# Patient Record
Sex: MALE | Race: WHITE | Employment: FULL TIME | URBAN - METROPOLITAN AREA
[De-identification: names, ages, dates, MRNs, and addresses within clinical notes are randomized per-mention and may not be internally consistent; named-entity substitution may affect disease eponyms.]

---

## 2017-01-23 ENCOUNTER — OFFICE VISIT (OUTPATIENT)
Dept: PULMONOLOGY | Age: 32
End: 2017-01-23

## 2017-01-23 VITALS
TEMPERATURE: 98.4 F | BODY MASS INDEX: 42.66 KG/M2 | SYSTOLIC BLOOD PRESSURE: 142 MMHG | RESPIRATION RATE: 16 BRPM | DIASTOLIC BLOOD PRESSURE: 92 MMHG | HEIGHT: 72 IN | HEART RATE: 93 BPM | WEIGHT: 315 LBS | OXYGEN SATURATION: 98 %

## 2017-01-23 DIAGNOSIS — G47.33 OSA TREATED WITH BIPAP: Primary | ICD-10-CM

## 2017-01-23 DIAGNOSIS — R03.0 ELEVATED BLOOD PRESSURE (NOT HYPERTENSION): ICD-10-CM

## 2017-01-23 PROCEDURE — 99213 OFFICE O/P EST LOW 20 MIN: CPT | Performed by: NURSE PRACTITIONER

## 2017-01-23 ASSESSMENT — SLEEP AND FATIGUE QUESTIONNAIRES
HOW LIKELY ARE YOU TO NOD OFF OR FALL ASLEEP IN A CAR, WHILE STOPPED FOR A FEW MINUTES IN TRAFFIC: 0
HOW LIKELY ARE YOU TO NOD OFF OR FALL ASLEEP WHILE SITTING AND READING: 1
HOW LIKELY ARE YOU TO NOD OFF OR FALL ASLEEP WHILE SITTING AND TALKING TO SOMEONE: 0
HOW LIKELY ARE YOU TO NOD OFF OR FALL ASLEEP WHILE LYING DOWN TO REST IN THE AFTERNOON WHEN CIRCUMSTANCES PERMIT: 1
HOW LIKELY ARE YOU TO NOD OFF OR FALL ASLEEP WHILE SITTING QUIETLY AFTER LUNCH WITHOUT ALCOHOL: 0
NECK CIRCUMFERENCE (INCHES): 17.5
HOW LIKELY ARE YOU TO NOD OFF OR FALL ASLEEP WHEN YOU ARE A PASSENGER IN A CAR FOR AN HOUR WITHOUT A BREAK: 0
HOW LIKELY ARE YOU TO NOD OFF OR FALL ASLEEP WHILE WATCHING TV: 1
HOW LIKELY ARE YOU TO NOD OFF OR FALL ASLEEP WHILE SITTING INACTIVE IN A PUBLIC PLACE: 0
ESS TOTAL SCORE: 3

## 2017-02-02 ENCOUNTER — TELEPHONE (OUTPATIENT)
Dept: BARIATRICS/WEIGHT MGMT | Age: 32
End: 2017-02-02

## 2017-04-20 ENCOUNTER — OFFICE VISIT (OUTPATIENT)
Dept: BARIATRICS/WEIGHT MGMT | Age: 32
End: 2017-04-20

## 2017-04-20 VITALS
BODY MASS INDEX: 42.66 KG/M2 | HEART RATE: 79 BPM | RESPIRATION RATE: 20 BRPM | SYSTOLIC BLOOD PRESSURE: 137 MMHG | DIASTOLIC BLOOD PRESSURE: 89 MMHG | WEIGHT: 315 LBS | HEIGHT: 72 IN

## 2017-04-20 DIAGNOSIS — E66.01 MORBID OBESITY WITH BMI OF 45.0-49.9, ADULT (HCC): Primary | ICD-10-CM

## 2017-04-20 DIAGNOSIS — I10 HYPERTENSION, ESSENTIAL: ICD-10-CM

## 2017-04-20 DIAGNOSIS — G47.33 SLEEP APNEA, OBSTRUCTIVE: ICD-10-CM

## 2017-04-20 PROCEDURE — 99205 OFFICE O/P NEW HI 60 MIN: CPT | Performed by: SURGERY

## 2017-04-25 ENCOUNTER — TELEPHONE (OUTPATIENT)
Dept: BARIATRICS/WEIGHT MGMT | Age: 32
End: 2017-04-25

## 2017-05-31 ENCOUNTER — OFFICE VISIT (OUTPATIENT)
Dept: BARIATRICS/WEIGHT MGMT | Age: 32
End: 2017-05-31

## 2017-05-31 VITALS
HEIGHT: 72 IN | SYSTOLIC BLOOD PRESSURE: 138 MMHG | HEART RATE: 70 BPM | DIASTOLIC BLOOD PRESSURE: 88 MMHG | WEIGHT: 315 LBS | BODY MASS INDEX: 42.66 KG/M2 | RESPIRATION RATE: 16 BRPM

## 2017-05-31 DIAGNOSIS — G47.33 SLEEP APNEA, OBSTRUCTIVE: ICD-10-CM

## 2017-05-31 DIAGNOSIS — I10 HYPERTENSION, ESSENTIAL: ICD-10-CM

## 2017-05-31 DIAGNOSIS — E66.01 MORBID OBESITY WITH BMI OF 45.0-49.9, ADULT (HCC): Primary | ICD-10-CM

## 2017-05-31 PROCEDURE — 99213 OFFICE O/P EST LOW 20 MIN: CPT | Performed by: NURSE PRACTITIONER

## 2017-05-31 ASSESSMENT — ENCOUNTER SYMPTOMS
EYES NEGATIVE: 1
GASTROINTESTINAL NEGATIVE: 1
RESPIRATORY NEGATIVE: 1
ALLERGIC/IMMUNOLOGIC NEGATIVE: 1

## 2017-06-21 ENCOUNTER — OFFICE VISIT (OUTPATIENT)
Dept: BARIATRICS/WEIGHT MGMT | Age: 32
End: 2017-06-21

## 2017-06-21 VITALS
SYSTOLIC BLOOD PRESSURE: 135 MMHG | HEIGHT: 72 IN | DIASTOLIC BLOOD PRESSURE: 89 MMHG | RESPIRATION RATE: 18 BRPM | HEART RATE: 74 BPM | WEIGHT: 315 LBS | BODY MASS INDEX: 42.66 KG/M2

## 2017-06-21 DIAGNOSIS — I10 HYPERTENSION, ESSENTIAL: ICD-10-CM

## 2017-06-21 DIAGNOSIS — E66.01 MORBID OBESITY WITH BMI OF 45.0-49.9, ADULT (HCC): Primary | ICD-10-CM

## 2017-06-21 DIAGNOSIS — E66.01 MORBID OBESITY WITH BMI OF 45.0-49.9, ADULT (HCC): ICD-10-CM

## 2017-06-21 DIAGNOSIS — G47.33 SLEEP APNEA, OBSTRUCTIVE: ICD-10-CM

## 2017-06-21 LAB
A/G RATIO: 1.4 (ref 1.1–2.2)
ALBUMIN SERPL-MCNC: 4.5 G/DL (ref 3.4–5)
ALP BLD-CCNC: 70 U/L (ref 40–129)
ALT SERPL-CCNC: 29 U/L (ref 10–40)
ANION GAP SERPL CALCULATED.3IONS-SCNC: 16 MMOL/L (ref 3–16)
AST SERPL-CCNC: 19 U/L (ref 15–37)
BASOPHILS ABSOLUTE: 0 K/UL (ref 0–0.2)
BASOPHILS RELATIVE PERCENT: 0.3 %
BILIRUB SERPL-MCNC: 0.5 MG/DL (ref 0–1)
BUN BLDV-MCNC: 19 MG/DL (ref 7–20)
CALCIUM SERPL-MCNC: 9.9 MG/DL (ref 8.3–10.6)
CHLORIDE BLD-SCNC: 100 MMOL/L (ref 99–110)
CHOLESTEROL, TOTAL: 170 MG/DL (ref 0–199)
CO2: 25 MMOL/L (ref 21–32)
CREAT SERPL-MCNC: 0.7 MG/DL (ref 0.9–1.3)
EOSINOPHILS ABSOLUTE: 0.2 K/UL (ref 0–0.6)
EOSINOPHILS RELATIVE PERCENT: 2.8 %
FOLATE: 6.89 NG/ML (ref 4.78–24.2)
GFR AFRICAN AMERICAN: >60
GFR NON-AFRICAN AMERICAN: >60
GLOBULIN: 3.3 G/DL
GLUCOSE BLD-MCNC: 93 MG/DL (ref 70–99)
HCT VFR BLD CALC: 43.2 % (ref 40.5–52.5)
HDLC SERPL-MCNC: 53 MG/DL (ref 40–60)
HEMOGLOBIN: 13.7 G/DL (ref 13.5–17.5)
IRON SATURATION: 18 % (ref 20–50)
IRON: 73 UG/DL (ref 59–158)
LDL CHOLESTEROL CALCULATED: 95 MG/DL
LYMPHOCYTES ABSOLUTE: 2.4 K/UL (ref 1–5.1)
LYMPHOCYTES RELATIVE PERCENT: 32.1 %
MCH RBC QN AUTO: 26.5 PG (ref 26–34)
MCHC RBC AUTO-ENTMCNC: 31.7 G/DL (ref 31–36)
MCV RBC AUTO: 83.5 FL (ref 80–100)
MONOCYTES ABSOLUTE: 0.6 K/UL (ref 0–1.3)
MONOCYTES RELATIVE PERCENT: 7.6 %
NEUTROPHILS ABSOLUTE: 4.2 K/UL (ref 1.7–7.7)
NEUTROPHILS RELATIVE PERCENT: 57.2 %
PDW BLD-RTO: 15.1 % (ref 12.4–15.4)
PLATELET # BLD: 328 K/UL (ref 135–450)
PMV BLD AUTO: 8.8 FL (ref 5–10.5)
POTASSIUM SERPL-SCNC: 4.6 MMOL/L (ref 3.5–5.1)
RBC # BLD: 5.18 M/UL (ref 4.2–5.9)
SODIUM BLD-SCNC: 141 MMOL/L (ref 136–145)
TOTAL IRON BINDING CAPACITY: 395 UG/DL (ref 260–445)
TOTAL PROTEIN: 7.8 G/DL (ref 6.4–8.2)
TRIGL SERPL-MCNC: 112 MG/DL (ref 0–150)
TSH REFLEX: 3.94 UIU/ML (ref 0.27–4.2)
VITAMIN B-12: 543 PG/ML (ref 211–911)
VITAMIN D 25-HYDROXY: 30.2 NG/ML
VLDLC SERPL CALC-MCNC: 22 MG/DL
WBC # BLD: 7.4 K/UL (ref 4–11)

## 2017-06-21 PROCEDURE — 99213 OFFICE O/P EST LOW 20 MIN: CPT | Performed by: NURSE PRACTITIONER

## 2017-06-21 ASSESSMENT — ENCOUNTER SYMPTOMS
EYES NEGATIVE: 1
RESPIRATORY NEGATIVE: 1
GASTROINTESTINAL NEGATIVE: 1
ALLERGIC/IMMUNOLOGIC NEGATIVE: 1

## 2017-06-22 ENCOUNTER — OFFICE VISIT (OUTPATIENT)
Dept: CARDIOLOGY CLINIC | Age: 32
End: 2017-06-22

## 2017-06-22 VITALS
HEART RATE: 68 BPM | WEIGHT: 315 LBS | DIASTOLIC BLOOD PRESSURE: 86 MMHG | BODY MASS INDEX: 42.66 KG/M2 | OXYGEN SATURATION: 94 % | SYSTOLIC BLOOD PRESSURE: 122 MMHG | HEIGHT: 72 IN

## 2017-06-22 DIAGNOSIS — I10 HYPERTENSION, ESSENTIAL: ICD-10-CM

## 2017-06-22 DIAGNOSIS — E66.01 MORBID OBESITY WITH BMI OF 45.0-49.9, ADULT (HCC): ICD-10-CM

## 2017-06-22 DIAGNOSIS — Z01.818 PRE-OP TESTING: Primary | ICD-10-CM

## 2017-06-22 LAB
ESTIMATED AVERAGE GLUCOSE: 125.5 MG/DL
HBA1C MFR BLD: 6 %

## 2017-06-22 PROCEDURE — 99204 OFFICE O/P NEW MOD 45 MIN: CPT | Performed by: INTERNAL MEDICINE

## 2017-06-22 PROCEDURE — 93000 ELECTROCARDIOGRAM COMPLETE: CPT | Performed by: INTERNAL MEDICINE

## 2017-07-07 ENCOUNTER — TELEPHONE (OUTPATIENT)
Dept: BARIATRICS/WEIGHT MGMT | Age: 32
End: 2017-07-07

## 2017-07-19 ENCOUNTER — OFFICE VISIT (OUTPATIENT)
Dept: BARIATRICS/WEIGHT MGMT | Age: 32
End: 2017-07-19

## 2017-07-19 VITALS
HEART RATE: 73 BPM | WEIGHT: 315 LBS | DIASTOLIC BLOOD PRESSURE: 88 MMHG | HEIGHT: 72 IN | SYSTOLIC BLOOD PRESSURE: 133 MMHG | BODY MASS INDEX: 42.66 KG/M2

## 2017-07-19 DIAGNOSIS — G47.33 SLEEP APNEA, OBSTRUCTIVE: ICD-10-CM

## 2017-07-19 DIAGNOSIS — I10 HYPERTENSION, ESSENTIAL: ICD-10-CM

## 2017-07-19 DIAGNOSIS — E66.01 MORBID OBESITY WITH BMI OF 45.0-49.9, ADULT (HCC): Primary | ICD-10-CM

## 2017-07-19 PROCEDURE — 99213 OFFICE O/P EST LOW 20 MIN: CPT | Performed by: NURSE PRACTITIONER

## 2017-07-19 ASSESSMENT — ENCOUNTER SYMPTOMS
EYES NEGATIVE: 1
RESPIRATORY NEGATIVE: 1
ALLERGIC/IMMUNOLOGIC NEGATIVE: 1
GASTROINTESTINAL NEGATIVE: 1

## 2017-07-28 ENCOUNTER — HOSPITAL ENCOUNTER (OUTPATIENT)
Dept: ENDOSCOPY | Age: 32
Discharge: HOME | End: 2017-07-28
Attending: SURGERY | Admitting: SURGERY

## 2017-07-28 VITALS
HEART RATE: 58 BPM | WEIGHT: 315 LBS | RESPIRATION RATE: 16 BRPM | BODY MASS INDEX: 48.96 KG/M2 | TEMPERATURE: 97.1 F | SYSTOLIC BLOOD PRESSURE: 108 MMHG | OXYGEN SATURATION: 98 % | DIASTOLIC BLOOD PRESSURE: 66 MMHG

## 2017-07-28 DIAGNOSIS — K21.9 CHRONIC GERD: ICD-10-CM

## 2017-07-28 PROCEDURE — 43239 EGD BIOPSY SINGLE/MULTIPLE: CPT | Performed by: SURGERY

## 2017-07-28 RX ORDER — FAMOTIDINE 20 MG/1
20 TABLET, FILM COATED ORAL NIGHTLY PRN
Qty: 60 TABLET | Refills: 1 | Status: SHIPPED | OUTPATIENT
Start: 2017-07-28 | End: 2017-09-05 | Stop reason: ALTCHOICE

## 2017-07-28 RX ORDER — SODIUM CHLORIDE 0.9 % (FLUSH) 0.9 %
10 SYRINGE (ML) INJECTION EVERY 12 HOURS SCHEDULED
Status: DISCONTINUED | OUTPATIENT
Start: 2017-07-28 | End: 2017-07-29 | Stop reason: HOSPADM

## 2017-07-28 RX ORDER — SODIUM CHLORIDE 0.9 % (FLUSH) 0.9 %
10 SYRINGE (ML) INJECTION PRN
Status: DISCONTINUED | OUTPATIENT
Start: 2017-07-28 | End: 2017-07-29 | Stop reason: HOSPADM

## 2017-07-28 RX ORDER — SODIUM CHLORIDE 9 MG/ML
INJECTION, SOLUTION INTRAVENOUS CONTINUOUS
Status: DISCONTINUED | OUTPATIENT
Start: 2017-07-28 | End: 2017-07-29 | Stop reason: HOSPADM

## 2017-07-28 RX ADMIN — SODIUM CHLORIDE: 9 INJECTION, SOLUTION INTRAVENOUS at 11:13

## 2017-07-28 ASSESSMENT — ENCOUNTER SYMPTOMS: SHORTNESS OF BREATH: 0

## 2017-08-03 ENCOUNTER — INITIAL CONSULT (OUTPATIENT)
Dept: BARIATRICS/WEIGHT MGMT | Age: 32
End: 2017-08-03

## 2017-08-03 ENCOUNTER — HOSPITAL ENCOUNTER (OUTPATIENT)
Dept: ULTRASOUND IMAGING | Age: 32
Discharge: HOME | End: 2017-08-03
Attending: SURGERY | Admitting: SURGERY

## 2017-08-03 DIAGNOSIS — E66.01 MORBID OBESITY WITH BMI OF 45.0-49.9, ADULT (HCC): ICD-10-CM

## 2017-08-03 DIAGNOSIS — G47.33 SLEEP APNEA, OBSTRUCTIVE: ICD-10-CM

## 2017-08-03 DIAGNOSIS — E66.01 MORBID OBESITY WITH BMI OF 45.0-49.9, ADULT (HCC): Primary | ICD-10-CM

## 2017-08-03 DIAGNOSIS — I10 HYPERTENSION, ESSENTIAL: ICD-10-CM

## 2017-08-03 DIAGNOSIS — E66.01 MORBID (SEVERE) OBESITY DUE TO EXCESS CALORIES (HCC): ICD-10-CM

## 2017-08-03 PROCEDURE — 96101 PR PSYCHOLOGIC TESTING BY PSYCH/PHYS: CPT | Performed by: PSYCHOLOGIST

## 2017-08-03 PROCEDURE — 90791 PSYCH DIAGNOSTIC EVALUATION: CPT | Performed by: PSYCHOLOGIST

## 2017-08-11 ENCOUNTER — TELEPHONE (OUTPATIENT)
Dept: BARIATRICS/WEIGHT MGMT | Age: 32
End: 2017-08-11

## 2017-08-11 DIAGNOSIS — K80.20 GALLSTONES: ICD-10-CM

## 2017-08-11 DIAGNOSIS — K76.0 FATTY LIVER: Primary | ICD-10-CM

## 2017-08-23 ENCOUNTER — OFFICE VISIT (OUTPATIENT)
Dept: BARIATRICS/WEIGHT MGMT | Age: 32
End: 2017-08-23

## 2017-08-23 VITALS
HEART RATE: 76 BPM | DIASTOLIC BLOOD PRESSURE: 88 MMHG | RESPIRATION RATE: 16 BRPM | WEIGHT: 315 LBS | HEIGHT: 72 IN | SYSTOLIC BLOOD PRESSURE: 138 MMHG | BODY MASS INDEX: 42.66 KG/M2

## 2017-08-23 DIAGNOSIS — K21.9 CHRONIC GERD: ICD-10-CM

## 2017-08-23 DIAGNOSIS — G47.33 SLEEP APNEA, OBSTRUCTIVE: ICD-10-CM

## 2017-08-23 DIAGNOSIS — K76.0 FATTY LIVER: ICD-10-CM

## 2017-08-23 DIAGNOSIS — E66.01 MORBID OBESITY WITH BMI OF 45.0-49.9, ADULT (HCC): Primary | ICD-10-CM

## 2017-08-23 DIAGNOSIS — I10 HYPERTENSION, ESSENTIAL: ICD-10-CM

## 2017-08-23 PROCEDURE — 99213 OFFICE O/P EST LOW 20 MIN: CPT | Performed by: NURSE PRACTITIONER

## 2017-08-23 ASSESSMENT — ENCOUNTER SYMPTOMS
ALLERGIC/IMMUNOLOGIC NEGATIVE: 1
RESPIRATORY NEGATIVE: 1
EYES NEGATIVE: 1
GASTROINTESTINAL NEGATIVE: 1

## 2017-09-05 ENCOUNTER — OFFICE VISIT (OUTPATIENT)
Dept: FAMILY MEDICINE CLINIC | Age: 32
End: 2017-09-05

## 2017-09-05 VITALS
HEART RATE: 76 BPM | WEIGHT: 315 LBS | RESPIRATION RATE: 16 BRPM | DIASTOLIC BLOOD PRESSURE: 95 MMHG | SYSTOLIC BLOOD PRESSURE: 139 MMHG | TEMPERATURE: 98.2 F | HEIGHT: 70 IN | BODY MASS INDEX: 45.1 KG/M2

## 2017-09-05 DIAGNOSIS — I10 HYPERTENSION, ESSENTIAL: ICD-10-CM

## 2017-09-05 DIAGNOSIS — K21.9 CHRONIC GERD: ICD-10-CM

## 2017-09-05 DIAGNOSIS — K80.20 GALLSTONES: ICD-10-CM

## 2017-09-05 DIAGNOSIS — K76.0 FATTY LIVER: ICD-10-CM

## 2017-09-05 DIAGNOSIS — G47.33 SLEEP APNEA, OBSTRUCTIVE: ICD-10-CM

## 2017-09-05 DIAGNOSIS — Z23 NEED FOR PROPHYLACTIC VACCINATION AGAINST DIPHTHERIA-TETANUS-PERTUSSIS (DTP): Primary | ICD-10-CM

## 2017-09-05 DIAGNOSIS — E66.01 MORBID OBESITY WITH BMI OF 45.0-49.9, ADULT (HCC): ICD-10-CM

## 2017-09-05 PROCEDURE — 99203 OFFICE O/P NEW LOW 30 MIN: CPT | Performed by: FAMILY MEDICINE

## 2017-09-05 PROCEDURE — 90715 TDAP VACCINE 7 YRS/> IM: CPT | Performed by: FAMILY MEDICINE

## 2017-09-05 PROCEDURE — 90471 IMMUNIZATION ADMIN: CPT | Performed by: FAMILY MEDICINE

## 2017-09-05 ASSESSMENT — PATIENT HEALTH QUESTIONNAIRE - PHQ9
1. LITTLE INTEREST OR PLEASURE IN DOING THINGS: 0
SUM OF ALL RESPONSES TO PHQ QUESTIONS 1-9: 0
SUM OF ALL RESPONSES TO PHQ9 QUESTIONS 1 & 2: 0
2. FEELING DOWN, DEPRESSED OR HOPELESS: 0

## 2017-09-05 ASSESSMENT — ENCOUNTER SYMPTOMS
ABDOMINAL PAIN: 0
CONSTIPATION: 0
NAUSEA: 0
VOMITING: 0
DIARRHEA: 0

## 2017-09-14 ENCOUNTER — OFFICE VISIT (OUTPATIENT)
Dept: BARIATRICS/WEIGHT MGMT | Age: 32
End: 2017-09-14

## 2017-09-14 VITALS
HEART RATE: 70 BPM | BODY MASS INDEX: 42.66 KG/M2 | SYSTOLIC BLOOD PRESSURE: 138 MMHG | HEIGHT: 72 IN | DIASTOLIC BLOOD PRESSURE: 99 MMHG | RESPIRATION RATE: 16 BRPM | WEIGHT: 315 LBS

## 2017-09-14 DIAGNOSIS — G47.33 SLEEP APNEA, OBSTRUCTIVE: ICD-10-CM

## 2017-09-14 DIAGNOSIS — K21.9 CHRONIC GERD: ICD-10-CM

## 2017-09-14 DIAGNOSIS — K80.20 GALLSTONES: ICD-10-CM

## 2017-09-14 DIAGNOSIS — K76.0 FATTY LIVER: ICD-10-CM

## 2017-09-14 DIAGNOSIS — I10 HYPERTENSION, ESSENTIAL: ICD-10-CM

## 2017-09-14 DIAGNOSIS — E66.01 MORBID OBESITY WITH BMI OF 45.0-49.9, ADULT (HCC): Primary | ICD-10-CM

## 2017-09-14 PROCEDURE — 99213 OFFICE O/P EST LOW 20 MIN: CPT | Performed by: SURGERY

## 2017-09-21 ENCOUNTER — OFFICE VISIT (OUTPATIENT)
Dept: FAMILY MEDICINE CLINIC | Age: 32
End: 2017-09-21

## 2017-09-21 VITALS
DIASTOLIC BLOOD PRESSURE: 84 MMHG | SYSTOLIC BLOOD PRESSURE: 142 MMHG | RESPIRATION RATE: 14 BRPM | WEIGHT: 315 LBS | BODY MASS INDEX: 42.66 KG/M2 | OXYGEN SATURATION: 98 % | HEIGHT: 72 IN | HEART RATE: 89 BPM

## 2017-09-21 DIAGNOSIS — I10 HYPERTENSION, ESSENTIAL: ICD-10-CM

## 2017-09-21 DIAGNOSIS — E66.01 MORBID OBESITY WITH BMI OF 45.0-49.9, ADULT (HCC): Primary | ICD-10-CM

## 2017-09-21 PROCEDURE — 99213 OFFICE O/P EST LOW 20 MIN: CPT | Performed by: FAMILY MEDICINE

## 2017-10-02 NOTE — PROGRESS NOTES
Patient Name:  Lavon Oviedo            Type of Surgery: Sleeve          Date of Surgery: 10/30/17             Start Pre-Op Diet On:  10/17/17           Start Clear Liquids On: 10/29/17         If you are having a gastric bypass, start Bowel Prep between 2-4pm the day prior to surgery. NPO after midnight the night before your surgery! Take morning medications with a small amount of water.     NOTES:___________________________________________________________________________________________________________________________________________________________________________________________________________________________________________________________________________________________________________________________________________________________________________________

## 2017-10-03 ENCOUNTER — OFFICE VISIT (OUTPATIENT)
Dept: BARIATRICS/WEIGHT MGMT | Age: 32
End: 2017-10-03

## 2017-10-03 VITALS
SYSTOLIC BLOOD PRESSURE: 130 MMHG | HEIGHT: 72 IN | BODY MASS INDEX: 42.66 KG/M2 | WEIGHT: 315 LBS | RESPIRATION RATE: 16 BRPM | HEART RATE: 86 BPM | DIASTOLIC BLOOD PRESSURE: 70 MMHG

## 2017-10-03 DIAGNOSIS — E66.01 MORBID OBESITY WITH BMI OF 45.0-49.9, ADULT (HCC): Primary | ICD-10-CM

## 2017-10-03 PROCEDURE — 99999 PR OFFICE/OUTPT VISIT,PROCEDURE ONLY: CPT | Performed by: NURSE PRACTITIONER

## 2017-10-03 NOTE — PROGRESS NOTES
Patient attended preoperative class where they were provided with education related to their bariatric surgery, common surgical complications, medications preoperatively & postoperatively, special concerns related to bariatric surgery postoperatively, vitamin supplementation, patient agreement, PAT & scheduling, hospital course, wellness discovery program and what to do in the case of an emergency postoperatively. The dietitians reviewed all preoperative and postoperative diet instructions.

## 2017-10-05 ENCOUNTER — TELEPHONE (OUTPATIENT)
Dept: BARIATRICS/WEIGHT MGMT | Age: 32
End: 2017-10-05

## 2017-10-09 ENCOUNTER — TELEPHONE (OUTPATIENT)
Dept: BARIATRICS/WEIGHT MGMT | Age: 32
End: 2017-10-09

## 2017-10-09 NOTE — TELEPHONE ENCOUNTER
Klarissa please call patient regarding surgery dates options. He stated he had spoken with you before.

## 2017-11-01 ENCOUNTER — OFFICE VISIT (OUTPATIENT)
Dept: FAMILY MEDICINE CLINIC | Age: 32
End: 2017-11-01

## 2017-11-01 VITALS
DIASTOLIC BLOOD PRESSURE: 101 MMHG | SYSTOLIC BLOOD PRESSURE: 129 MMHG | HEIGHT: 69 IN | BODY MASS INDEX: 46.65 KG/M2 | TEMPERATURE: 98.4 F | WEIGHT: 315 LBS | HEART RATE: 80 BPM | RESPIRATION RATE: 16 BRPM

## 2017-11-01 DIAGNOSIS — Z01.818 PREOP GENERAL PHYSICAL EXAM: Primary | ICD-10-CM

## 2017-11-01 PROCEDURE — G8484 FLU IMMUNIZE NO ADMIN: HCPCS | Performed by: FAMILY MEDICINE

## 2017-11-01 PROCEDURE — G8417 CALC BMI ABV UP PARAM F/U: HCPCS | Performed by: FAMILY MEDICINE

## 2017-11-01 PROCEDURE — G8427 DOCREV CUR MEDS BY ELIG CLIN: HCPCS | Performed by: FAMILY MEDICINE

## 2017-11-01 PROCEDURE — 99244 OFF/OP CNSLTJ NEW/EST MOD 40: CPT | Performed by: FAMILY MEDICINE

## 2017-11-01 NOTE — PROGRESS NOTES
Preoperative Consultation      Ej Vila  YOB: 1985    Date of Service:  11/1/2017    Vitals:    11/01/17 0950 11/01/17 0955   BP: (!) 135/94 (!) 129/101   Site: Left Arm Right Arm   Position: Sitting Sitting   Pulse: 74 80   Resp: 16    Temp: 98.4 °F (36.9 °C)    TempSrc: Oral    Weight: (!) 354 lb (160.6 kg)    Height: 5' 9\" (1.753 m)       Wt Readings from Last 2 Encounters:   11/01/17 (!) 354 lb (160.6 kg)   10/06/17 (!) 360 lb (163.3 kg)     BP Readings from Last 3 Encounters:   11/01/17 (!) 129/101   10/03/17 130/70   09/21/17 (!) 142/84        Chief Complaint   Patient presents with   Conrado Arora Pre-op Exam     weight loss, Dr. Radha Isaac, 11/13/17, Raymundo Dean     No Known Allergies  No outpatient prescriptions have been marked as taking for the 11/1/17 encounter (Office Visit) with Fanta Madrid DO. This patient presents to the office today for a preoperative consultation at the request of surgeon, Dr. Radha Isaac, who plans on performing weight loss surgery on November 13 2017 at [facility].  The current problem began - pt has always been heavy and symptoms (sleep apnea) has been improving. Conservative therapy: yes, diets, which have not been effective. Started recommended pre-op diet 2 days ago.     Planned anesthesia: General   Known anesthesia problems: None   Bleeding risk: No recent or remote history of abnormal bleeding  Personal or FH of DVT/PE: No    Patient objection to receiving blood products: No    Patient Active Problem List   Diagnosis    Sleep apnea, obstructive    Morbid obesity with BMI of 45.0-49.9, adult ([address]    Hypertension, essential    Chronic GERD    Gallstones    Fatty liver       Past Medical History:   Diagnosis Date    Hypertension, essential 4/20/2017    Sleep apnea     USES BI-PAP     Past Surgical History:   Procedure Laterality Date    UPPER GASTROINTESTINAL ENDOSCOPY  07/28/2017     Family History   Problem Relation Age of and time. He has normal strength. No cranial nerve deficit or sensory deficit. Coordination and gait normal.   Skin: Skin is warm and dry. No rash noted. No erythema. Psychiatric: He has a normal mood and affect.  His behavior is normal.     EKG Interpretation: Had EKG in June 2017 and was told he did not another one  Lab Review   Hospital Outpatient Visit on 10/30/2017   Component Date Value    Sodium 10/31/2017 146*    Potassium 10/31/2017 4.2     Chloride 10/31/2017 104     CO2 10/31/2017 26     Anion Gap 10/31/2017 16     Glucose 10/31/2017 91     BUN 10/31/2017 18     CREATININE 10/31/2017 0.8*    GFR Non- 10/31/2017 >60     GFR  10/31/2017 >60     Calcium 10/31/2017 9.5     Total Protein 10/31/2017 7.7     Alb 10/31/2017 4.2     Albumin/Globulin Ratio 10/31/2017 1.2     Total Bilirubin 10/31/2017 0.3     Alkaline Phosphatase 10/31/2017 72     ALT 10/31/2017 39     AST 10/31/2017 22     Globulin 10/31/2017 3.5     WBC 10/31/2017 8.2     RBC 10/31/2017 5.32     Hemoglobin 10/31/2017 14.4     Hematocrit 10/31/2017 43.9     MCV 10/31/2017 82.6     MCH 10/31/2017 27.2     MCHC 10/31/2017 32.9     RDW 10/31/2017 14.7     Platelets 80/81/3043 325     MPV 10/31/2017 9.0     ABO/Rh 10/31/2017 A NEG     Antibody Screen 10/31/2017 NEG    Orders Only on 06/21/2017   Component Date Value    WBC 06/21/2017 7.4     RBC 06/21/2017 5.18     Hemoglobin 06/21/2017 13.7     Hematocrit 06/21/2017 43.2     MCV 06/21/2017 83.5     MCH 06/21/2017 26.5     MCHC 06/21/2017 31.7     RDW 06/21/2017 15.1     Platelets 44/19/6785 328     MPV 06/21/2017 8.8     Neutrophils % 06/21/2017 57.2     Lymphocytes % 06/21/2017 32.1     Monocytes % 06/21/2017 7.6     Eosinophils % 06/21/2017 2.8     Basophils % 06/21/2017 0.3     Neutrophils # 06/21/2017 4.2     Lymphocytes # 06/21/2017 2.4     Monocytes # 06/21/2017 0.6     Eosinophils # 06/21/2017 0.2     Basophils administration of higher-dose, long-acting metoprolol in beta-blockernaïve patients on the day of surgery, and in the absence of dose titration is associated with an overall increase in mortality. Beta-blockers should be started days to weeks prior to surgery and titrated to pulse < 70.  4. Deep vein thrombosis prophylaxis: regimen to be chosen by surgical team  5.  No contraindications to planned surgery

## 2017-11-14 PROBLEM — Z98.84 S/P LAPAROSCOPIC SLEEVE GASTRECTOMY: Status: ACTIVE | Noted: 2017-11-14

## 2017-11-20 ENCOUNTER — TELEPHONE (OUTPATIENT)
Dept: BARIATRICS/WEIGHT MGMT | Age: 32
End: 2017-11-20

## 2017-11-20 NOTE — TELEPHONE ENCOUNTER
Surgery Type: Sleeve    Surgery Date: 11/13/17    The patient was contacted to follow up on their recent bariatric surgery. The following topics were reviewed:    [x] Hydration is Adequate   o Patient is getting at least 48-64 oz of fluids a day, not including protein shakes. [x] Food intake is appropriate  [x]Adequately pureeing foods, so that there are no chunks left. [x]Taking in 1-2 oz at a time  [x] Eating 4-6 times a day  [x]Reminded patient to keep food diary to bring to their 2 week follow up appointment. [x] Consuming Adequate Protein-Nectar    [x] Consuming 2 protein shakes a day        [x] Consuming 60-80 grams of protein a day  [x] Pain relief techniques utilized- Minimal pain   [] Taking pain medication as prescribed  [] Utilizing Lidoderm patches  []Taking Tylenol instead of prescription pain medication  [] Wearing abdominal binder  [x] Using ice for incisional pain  [x] Activity is appropriate  [x] Walking 10 minutes out of every hour  [x]Avoiding heavy lifting (>10lbs)  [x] Utilizing their incentive spirometer- Recommended use  [] Issues with Nausea/Vomiting/Reflux  [] Using Zofran PRN for nausea   []Taking Prilosec for reflux  [x] Following the 30-30-30 rule  [] Issues with Constipation-Denies  []Tried Colace  []Tried Miralax    All questions and concerns addressed. Patient seems to be doing very well. He plans to go back to work on 11/27/17 which is 2 weeks from his surgery. He will call on Wednesday to have a letter written as long as he is still feeling well. Patient was asked to please fill out hospital survey if she receives one in the mail.

## 2017-11-30 ENCOUNTER — OFFICE VISIT (OUTPATIENT)
Dept: BARIATRICS/WEIGHT MGMT | Age: 32
End: 2017-11-30

## 2017-11-30 VITALS
BODY MASS INDEX: 42.66 KG/M2 | RESPIRATION RATE: 16 BRPM | SYSTOLIC BLOOD PRESSURE: 138 MMHG | HEIGHT: 72 IN | DIASTOLIC BLOOD PRESSURE: 92 MMHG | HEART RATE: 67 BPM | WEIGHT: 315 LBS

## 2017-11-30 DIAGNOSIS — Z98.84 S/P LAPAROSCOPIC SLEEVE GASTRECTOMY: ICD-10-CM

## 2017-11-30 DIAGNOSIS — K21.9 CHRONIC GERD: ICD-10-CM

## 2017-11-30 DIAGNOSIS — G47.33 SLEEP APNEA, OBSTRUCTIVE: ICD-10-CM

## 2017-11-30 DIAGNOSIS — E66.01 MORBID OBESITY WITH BMI OF 45.0-49.9, ADULT (HCC): Primary | ICD-10-CM

## 2017-11-30 DIAGNOSIS — K76.0 FATTY LIVER: ICD-10-CM

## 2017-11-30 DIAGNOSIS — I10 HYPERTENSION, ESSENTIAL: ICD-10-CM

## 2017-11-30 PROCEDURE — 99024 POSTOP FOLLOW-UP VISIT: CPT | Performed by: SURGERY

## 2017-11-30 NOTE — PROGRESS NOTES
Dietary Assessment Note    Vitals:   Vitals:    11/30/17 1446 11/30/17 1448   BP: (!) 139/93 (!) 138/92   Pulse: 67    Resp: 16    Weight: (!) 329 lb 12.8 oz (149.6 kg)    Height: 6' (1.829 m)     Patient lost 36.2 lbs over 1 month. Total Weight Loss: 37 lbs    Labs reviewed: no lab studies available for review at time of visit    Protein intake: 60-80 grams/day     Fluid intake: 48-64 oz/day    Multivitamin/mineral intake: Fusion - how many/day: 3    Calcium intake: 1 Citracal petite    Other: none    Exercise: Yes. How much: trying to get in his walking    Nutrition Assessment: 2 wks post-op visit. eats a balanced diet Pt able to eat 3 oz in one sitting. B-shake, S-oatmeal or yogurt, L-tomato soup OR mashed pot OR lf cottage cheese, D-refried beans OR pureed tuna/chicken, S-shake and sometimes sf jello/pudding. Pt is drinking propel, decaf coffee. Following 30-30-30 rule.       Food Intolerances/issues: none    Client Concerns: None    Goals: Advance to Phase 3 on Monday 12/4    Plan: F/u at 6 post op or as directed      [facility]

## 2018-01-03 ENCOUNTER — OFFICE VISIT (OUTPATIENT)
Dept: BARIATRICS/WEIGHT MGMT | Age: 33
End: 2018-01-03

## 2018-01-03 VITALS
HEART RATE: 66 BPM | DIASTOLIC BLOOD PRESSURE: 98 MMHG | SYSTOLIC BLOOD PRESSURE: 140 MMHG | HEIGHT: 72 IN | BODY MASS INDEX: 42.66 KG/M2 | WEIGHT: 315 LBS | RESPIRATION RATE: 16 BRPM

## 2018-01-03 DIAGNOSIS — G47.33 SLEEP APNEA, OBSTRUCTIVE: ICD-10-CM

## 2018-01-03 DIAGNOSIS — Z98.84 S/P LAPAROSCOPIC SLEEVE GASTRECTOMY: Primary | ICD-10-CM

## 2018-01-03 DIAGNOSIS — I10 HYPERTENSION, ESSENTIAL: ICD-10-CM

## 2018-01-03 DIAGNOSIS — E66.01 MORBID OBESITY WITH BMI OF 40.0-44.9, ADULT (HCC): ICD-10-CM

## 2018-01-03 DIAGNOSIS — K21.9 CHRONIC GERD: ICD-10-CM

## 2018-01-03 DIAGNOSIS — K76.0 FATTY LIVER: ICD-10-CM

## 2018-01-03 PROCEDURE — 99024 POSTOP FOLLOW-UP VISIT: CPT | Performed by: NURSE PRACTITIONER

## 2018-01-03 RX ORDER — CYANOCOBALAMIN (VITAMIN B-12) 1000 MCG
1 TABLET, EXTENDED RELEASE ORAL
COMMUNITY
End: 2018-08-15

## 2018-01-03 ASSESSMENT — ENCOUNTER SYMPTOMS
GASTROINTESTINAL NEGATIVE: 1
RESPIRATORY NEGATIVE: 1
ROS SKIN COMMENTS: SURGICAL INCISIONS.
EYES NEGATIVE: 1
ALLERGIC/IMMUNOLOGIC NEGATIVE: 1

## 2018-01-03 NOTE — PROGRESS NOTES
1    ondansetron (ZOFRAN ODT) 4 MG disintegrating tablet, Take 2 tablets by mouth every 8 hours as needed for Nausea, Disp: 30 tablet, Rfl: 1    Review of Systems   Constitutional: Negative. HENT: Negative. Eyes: Negative. Respiratory: Negative. Cardiovascular: Negative. Gastrointestinal: Negative. Endocrine: Negative. Genitourinary: Negative. Musculoskeletal: Negative. Skin:        Surgical incisions. Allergic/Immunologic: Negative. Neurological: Negative. Hematological: Negative. Psychiatric/Behavioral: Negative. Objective:   Physical Exam   Constitutional: He is oriented to person, place, and time. He appears well-developed and well-nourished. HENT:   Head: Normocephalic and atraumatic. Eyes: Conjunctivae are normal. Pupils are equal, round, and reactive to light. Neck: Normal range of motion. Neck supple. Cardiovascular: Normal rate. Pulmonary/Chest: Effort normal.   Abdominal: Soft. Musculoskeletal: Normal range of motion. Neurological: He is alert and oriented to person, place, and time. Skin: Skin is warm and dry. Surgical incisions well healed. Psychiatric: He has a normal mood and affect. His behavior is normal. Judgment and thought content normal.   Vitals reviewed. Assessment and Plan:     Patient is 6 weeks s/p sleeve, down 4.8 lbs with a total weight loss of 41.8 lbs. The patient's current Body mass index is 44.08 kg/m². (1/3/18). He is doing well, denies n/v/dysphagia or reflux. He is tolerating diet, getting adequate fluids and protein. Needs to have more structured meal times. Bowels and bladder functioning. He is taking vitamins as instructed. He did meet with the registered dietitian for continued follow up. I agree with recommendations and plan. He is limited with exercising due to weather. Encouraged him to increase physical activity and the importance of exercise and weight loss. Incisions healing well.  No lifting restrictions

## 2018-01-26 ENCOUNTER — OFFICE VISIT (OUTPATIENT)
Dept: PULMONOLOGY | Age: 33
End: 2018-01-26

## 2018-01-26 VITALS
DIASTOLIC BLOOD PRESSURE: 88 MMHG | WEIGHT: 315 LBS | OXYGEN SATURATION: 97 % | BODY MASS INDEX: 42.66 KG/M2 | HEIGHT: 72 IN | RESPIRATION RATE: 18 BRPM | TEMPERATURE: 98.1 F | SYSTOLIC BLOOD PRESSURE: 136 MMHG | HEART RATE: 74 BPM

## 2018-01-26 DIAGNOSIS — G47.33 SLEEP APNEA, OBSTRUCTIVE: Primary | ICD-10-CM

## 2018-01-26 DIAGNOSIS — E66.01 MORBID OBESITY WITH BMI OF 40.0-44.9, ADULT (HCC): ICD-10-CM

## 2018-01-26 PROCEDURE — G8427 DOCREV CUR MEDS BY ELIG CLIN: HCPCS | Performed by: NURSE PRACTITIONER

## 2018-01-26 PROCEDURE — G8484 FLU IMMUNIZE NO ADMIN: HCPCS | Performed by: NURSE PRACTITIONER

## 2018-01-26 PROCEDURE — G8417 CALC BMI ABV UP PARAM F/U: HCPCS | Performed by: NURSE PRACTITIONER

## 2018-01-26 PROCEDURE — 99213 OFFICE O/P EST LOW 20 MIN: CPT | Performed by: NURSE PRACTITIONER

## 2018-01-26 PROCEDURE — 1036F TOBACCO NON-USER: CPT | Performed by: NURSE PRACTITIONER

## 2018-01-26 ASSESSMENT — SLEEP AND FATIGUE QUESTIONNAIRES
HOW LIKELY ARE YOU TO NOD OFF OR FALL ASLEEP WHILE SITTING AND READING: 1
HOW LIKELY ARE YOU TO NOD OFF OR FALL ASLEEP WHILE LYING DOWN TO REST IN THE AFTERNOON WHEN CIRCUMSTANCES PERMIT: 1
HOW LIKELY ARE YOU TO NOD OFF OR FALL ASLEEP WHILE WATCHING TV: 0
NECK CIRCUMFERENCE (INCHES): 16.5
HOW LIKELY ARE YOU TO NOD OFF OR FALL ASLEEP WHILE SITTING QUIETLY AFTER LUNCH WITHOUT ALCOHOL: 1
HOW LIKELY ARE YOU TO NOD OFF OR FALL ASLEEP WHILE SITTING AND TALKING TO SOMEONE: 0
HOW LIKELY ARE YOU TO NOD OFF OR FALL ASLEEP WHILE SITTING INACTIVE IN A PUBLIC PLACE: 0
HOW LIKELY ARE YOU TO NOD OFF OR FALL ASLEEP WHEN YOU ARE A PASSENGER IN A CAR FOR AN HOUR WITHOUT A BREAK: 0
HOW LIKELY ARE YOU TO NOD OFF OR FALL ASLEEP IN A CAR, WHILE STOPPED FOR A FEW MINUTES IN TRAFFIC: 0
ESS TOTAL SCORE: 3

## 2018-01-26 NOTE — PROGRESS NOTES
Patient down about 60 lbs since last year- had bariatric weight loss surgery. Congratulated and encouraged to continue  · Complications of DEIDRA if not treated were discussed with patient patient to include systemic hypertension, pulmonary hypertension, cardiovascular morbidities, car accidents and all cause mortality.   · Patient education handout provided regarding sleep tips and PAP cleaning recommendations     Follow up 1 year, sooner if needed

## 2018-01-26 NOTE — PATIENT INSTRUCTIONS
Uncomfortable bedding can prevent good sleep. Ensure your bedroom is quiet and comfortable. A cooler room along with enough blankets to stay warm is recommended. If your room is too noisy, try a white noise machine. If too bright, try black out shades or an eye mask. Dont take worries to bed. Leave worries about work, school etc. behind you when you go to bed. Some people find it helpful to assign a worry period in the evening or late afternoon to write down your worries and get them out of your system. CPAP Equipment Cleaning and Disinfecting Schedule  Equipment Cleaning Frequency Instructions  Disinfecting Frequency   Non-Disposable Filters  Weekly Mild soapy water, Rinse, Air Dry Not Required   Disposable Filters Change as needed  2-4 weeks Do Not Wash Not Required   Hose/tubing Daily Mild soapy water, Rinse, Air Dry Once a week   Mask / Nasal Pillows Daily Mild soapy water, Rinse, Air Dry Once a week   Headgear Weekly Hand wash, Mild soapy water, Rinse, Dry  Not Required   Humidifier Daily Empty water daily  Mild soapy water, Rinse well, Air Dry  Once a week   CPAP Unit As Needed Dust with damp cloth,  No detergents or sprays Not Required         Disinfect (per schedule) with 1 part white vinegar and 3 parts water- soak mask and water chamber for 30 minutes every 1-2 weeks, more often if sick. Allow water/vinegar mixture to run through tubing. Allow all equipment to air dry. Drying Hints:   Always hang tubing away from direct sunlight, as this will cause the tubing to become yellow, brittle and crack over a period of time. DO NOT attach the wet tubing to your CPAP unit to blow-dry it. The moisture from the tubing can drain back into your machine. Moisture in your unit can cause sudden pressure increases or short circuits  DO's and DON'Ts:  - Don't use alcohol-based products to clean your mask, because it can cause the materials to become hard and brittle.    - Don't put headgear in the washer or dryer  - Don't use any caustic or household cleaning solutions such as bleach on your CPAP   equipment.  - Do follow the recommended cleaning schedule. - Do change your disposable filter frequently. Adapted From: [email or web address]. shtm.   These are general suggestions for all models please follow specific s recommendations and specific instructions

## 2018-02-07 ENCOUNTER — OFFICE VISIT (OUTPATIENT)
Dept: FAMILY MEDICINE CLINIC | Age: 33
End: 2018-02-07

## 2018-02-07 VITALS
BODY MASS INDEX: 42.66 KG/M2 | HEART RATE: 80 BPM | OXYGEN SATURATION: 96 % | HEIGHT: 72 IN | SYSTOLIC BLOOD PRESSURE: 122 MMHG | RESPIRATION RATE: 14 BRPM | DIASTOLIC BLOOD PRESSURE: 84 MMHG | WEIGHT: 315 LBS

## 2018-02-07 DIAGNOSIS — I10 HYPERTENSION, ESSENTIAL: Primary | ICD-10-CM

## 2018-02-07 PROCEDURE — G8484 FLU IMMUNIZE NO ADMIN: HCPCS | Performed by: FAMILY MEDICINE

## 2018-02-07 PROCEDURE — 99213 OFFICE O/P EST LOW 20 MIN: CPT | Performed by: FAMILY MEDICINE

## 2018-02-07 PROCEDURE — G8417 CALC BMI ABV UP PARAM F/U: HCPCS | Performed by: FAMILY MEDICINE

## 2018-02-07 PROCEDURE — 1036F TOBACCO NON-USER: CPT | Performed by: FAMILY MEDICINE

## 2018-02-07 PROCEDURE — G8427 DOCREV CUR MEDS BY ELIG CLIN: HCPCS | Performed by: FAMILY MEDICINE

## 2018-02-07 ASSESSMENT — ENCOUNTER SYMPTOMS: ABDOMINAL PAIN: 0

## 2018-02-07 NOTE — PROGRESS NOTES
light.   Neck: Normal range of motion. Cardiovascular: Normal rate, regular rhythm and normal heart sounds. Pulmonary/Chest: Effort normal and breath sounds normal.   Abdominal: Bowel sounds are normal. There is no tenderness. Neurological: He is oriented to person, place, and time. Psychiatric: He has a normal mood and affect. His behavior is normal. Judgment and thought content normal.       Plan  1. Hypertension, essential  TSH without Reflex    Lipid Panel    CBC Auto Differential    Comprehensive Metabolic Panel    Vitamin D 25 Hydroxy    Magnesium    T4, Free    T3, Free       Return in about 6 months (around 8/7/2018) for Physical Exam.    Prior to Visit Medications    Medication Sig Taking?  Authorizing Provider   Multiple Vitamins-Minerals (BARIATRIC FUSION) CHEW Take 3 tablets by mouth daily Yes Historical Provider, MD   calcium citrate-vitamin D (CITRICAL + D) 315-250 MG-UNIT TABS per tablet Take 1 tablet by mouth daily (with breakfast) Yes Historical Provider, MD   omeprazole (PRILOSEC) 20 MG delayed release capsule Take 1 capsule by mouth Daily Yes Bhavik Loya NP   ondansetron (ZOFRAN ODT) 4 MG disintegrating tablet Take 2 tablets by mouth every 8 hours as needed for Nausea Yes Bhavik Loya NP

## 2018-02-14 ENCOUNTER — OFFICE VISIT (OUTPATIENT)
Dept: BARIATRICS/WEIGHT MGMT | Age: 33
End: 2018-02-14

## 2018-02-14 VITALS
BODY MASS INDEX: 41.99 KG/M2 | SYSTOLIC BLOOD PRESSURE: 134 MMHG | DIASTOLIC BLOOD PRESSURE: 84 MMHG | RESPIRATION RATE: 16 BRPM | HEIGHT: 72 IN | HEART RATE: 66 BPM | WEIGHT: 310 LBS

## 2018-02-14 DIAGNOSIS — Z98.84 S/P LAPAROSCOPIC SLEEVE GASTRECTOMY: Primary | ICD-10-CM

## 2018-02-14 DIAGNOSIS — K21.9 CHRONIC GERD: ICD-10-CM

## 2018-02-14 DIAGNOSIS — G47.33 SLEEP APNEA, OBSTRUCTIVE: ICD-10-CM

## 2018-02-14 DIAGNOSIS — K76.0 FATTY LIVER: ICD-10-CM

## 2018-02-14 DIAGNOSIS — E66.01 MORBID OBESITY WITH BMI OF 40.0-44.9, ADULT (HCC): ICD-10-CM

## 2018-02-14 DIAGNOSIS — I10 HYPERTENSION, ESSENTIAL: ICD-10-CM

## 2018-02-14 PROCEDURE — 99024 POSTOP FOLLOW-UP VISIT: CPT | Performed by: NURSE PRACTITIONER

## 2018-02-14 ASSESSMENT — ENCOUNTER SYMPTOMS
GASTROINTESTINAL NEGATIVE: 1
ALLERGIC/IMMUNOLOGIC NEGATIVE: 1
RESPIRATORY NEGATIVE: 1
EYES NEGATIVE: 1

## 2018-02-14 NOTE — PROGRESS NOTES
[facility]) Physicians   [employer] [facility] [employer]    Subjective:      Patient ID: Brock Ramon is a 35 y.o. male    HPI    12 weeks s/p sleeve gastrectomy    Brock Ramon is a very pleasant 35 y.o. obese male , Body mass index is 42.04 kg/m². Melyssa Marcelo Patient denies any nausea, vomiting, fevers, chills, shortness of breath, chest pain, constipation or urinary symptoms. Denies any heartburn nor dysphagia. Past Medical History:   Diagnosis Date    GERD (gastroesophageal reflux disease)     Hypertension, essential 4/20/2017    Sleep apnea     USES BI-PAP     Past Surgical History:   Procedure Laterality Date    SLEEVE GASTRECTOMY  11/13/2017    LAPAROSCOPIC SLEEVE GASTRECTOMY -ETHICON, TRANS-ABDOMINAL PLANE BLOCK    UPPER GASTROINTESTINAL ENDOSCOPY  07/28/2017     Family History   Problem Relation Age of Onset    COPD Mother     Arthritis Mother     High Blood Pressure Father     High Cholesterol Father     High Blood Pressure Paternal Uncle     High Cholesterol Paternal Uncle     High Cholesterol Paternal Grandmother     Clotting Disorder Paternal Grandmother     High Blood Pressure Paternal Grandfather     High Cholesterol Paternal Grandfather      Social History   Substance Use Topics    Smoking status: Never Smoker    Smokeless tobacco: Former User    Alcohol use Yes      Comment: occassional drinker     I counseled the patient on the importance of not smoking and risks of ETOH. No Known Allergies  Vitals:    02/14/18 0703   BP: 134/84   Pulse: 66   Resp: 16   Weight: (!) 310 lb (140.6 kg)   Height: 6' (1.829 m)     Body mass index is 42.04 kg/m². Current Outpatient Prescriptions:     Multiple Vitamins-Minerals (BARIATRIC FUSION) CHEW, Take 3 tablets by mouth daily, Disp: , Rfl:     calcium citrate-vitamin D (CITRICAL + D) 315-250 MG-UNIT TABS per tablet, Take 1 tablet by mouth daily (with breakfast), Disp: , Rfl:     Review of Systems   Constitutional: Negative. HENT: Negative.

## 2018-02-14 NOTE — PROGRESS NOTES
Dietary Assessment Note    Vitals:   Vitals:    02/14/18 0703   BP: 134/84   Pulse: 66   Resp: 16   Weight: (!) 310 lb (140.6 kg)   Height: 6' (1.829 m)    Patient lost 15 lbs over past 6 weeks. Total Weight Loss: 56.8 lb    Labs reviewed: labs are reviewed, up to date and normal, no lab studies available for review at time of visit    Protein intake: Patient not tracking - ~75g of protein based on recall    Fluid intake: 48-64oz - propel water & cup of decaf coffee (8-10oz) & unsweetened brewed tea    Multivitamin/mineral intake: fusion - how many/day: 3x/day    Calcium intake: 1 citracal petite    Other: none    Breakfast:  16oz protein shake (nectar w/ FF milk 16oz)    Snack:  cheese stick    Lunch:  Light lunchable (turkey w/ cheese & whole grain cracker ~300 hema) then 15-20 min later a V8    Snack:  apple    Dinner:  3oz protein & 4oz veggies (doesn't eat the whole thing)    Snack:  Did try some cake & ice cream w/ Bday    30-30-30? trying to do overall - lunch is a little more difficult    Amount per sitting? ~3/4c-1/c    Exercise:  No. - was walking on the treadmill ~4 nights/wk for ~30 min    Nutrition Assessment: 12 week post-op visit. Food Intolerances/issues: none    Client Concerns: none    Goals:   1. Avoid empty calories - cake/ice cream  2.  4 fusion when calcium is gone  3. Do not go over a cup volume  4.   Continue protein / veggies    Plan: f/u as needed      [employer]

## 2018-05-14 ENCOUNTER — OFFICE VISIT (OUTPATIENT)
Dept: BARIATRICS/WEIGHT MGMT | Age: 33
End: 2018-05-14

## 2018-05-14 VITALS
WEIGHT: 289 LBS | HEIGHT: 72 IN | HEART RATE: 63 BPM | SYSTOLIC BLOOD PRESSURE: 171 MMHG | BODY MASS INDEX: 39.14 KG/M2 | DIASTOLIC BLOOD PRESSURE: 95 MMHG

## 2018-05-14 DIAGNOSIS — I10 HYPERTENSION, ESSENTIAL: ICD-10-CM

## 2018-05-14 DIAGNOSIS — K21.9 CHRONIC GERD: ICD-10-CM

## 2018-05-14 DIAGNOSIS — K76.0 FATTY LIVER: ICD-10-CM

## 2018-05-14 DIAGNOSIS — Z98.84 S/P LAPAROSCOPIC SLEEVE GASTRECTOMY: Primary | ICD-10-CM

## 2018-05-14 DIAGNOSIS — G47.33 SLEEP APNEA, OBSTRUCTIVE: ICD-10-CM

## 2018-05-14 PROCEDURE — G8417 CALC BMI ABV UP PARAM F/U: HCPCS | Performed by: NURSE PRACTITIONER

## 2018-05-14 PROCEDURE — G8427 DOCREV CUR MEDS BY ELIG CLIN: HCPCS | Performed by: NURSE PRACTITIONER

## 2018-05-14 PROCEDURE — 1036F TOBACCO NON-USER: CPT | Performed by: NURSE PRACTITIONER

## 2018-05-14 PROCEDURE — 99213 OFFICE O/P EST LOW 20 MIN: CPT | Performed by: NURSE PRACTITIONER

## 2018-05-14 ASSESSMENT — ENCOUNTER SYMPTOMS
RESPIRATORY NEGATIVE: 1
EYES NEGATIVE: 1
ALLERGIC/IMMUNOLOGIC NEGATIVE: 1
GASTROINTESTINAL NEGATIVE: 1

## 2018-08-09 ENCOUNTER — HOSPITAL ENCOUNTER (OUTPATIENT)
Age: 33
Discharge: HOME | End: 2018-08-09
Payer: COMMERCIAL

## 2018-08-09 DIAGNOSIS — I10 HYPERTENSION, ESSENTIAL: ICD-10-CM

## 2018-08-09 DIAGNOSIS — K76.0 FATTY LIVER: ICD-10-CM

## 2018-08-09 DIAGNOSIS — Z98.84 S/P LAPAROSCOPIC SLEEVE GASTRECTOMY: ICD-10-CM

## 2018-08-09 LAB
A/G RATIO: 1.3 (ref 1.1–2.2)
ALBUMIN SERPL-MCNC: 4.1 G/DL (ref 3.4–5)
ALP BLD-CCNC: 81 U/L (ref 40–129)
ALT SERPL-CCNC: 16 U/L (ref 10–40)
ANION GAP SERPL CALCULATED.3IONS-SCNC: 10 MMOL/L (ref 3–16)
AST SERPL-CCNC: 18 U/L (ref 15–37)
BASOPHILS ABSOLUTE: 0.1 K/UL (ref 0–0.2)
BASOPHILS RELATIVE PERCENT: 0.9 %
BILIRUB SERPL-MCNC: 0.6 MG/DL (ref 0–1)
BUN BLDV-MCNC: 17 MG/DL (ref 7–20)
CALCIUM SERPL-MCNC: 9.5 MG/DL (ref 8.3–10.6)
CHLORIDE BLD-SCNC: 104 MMOL/L (ref 99–110)
CHOLESTEROL, TOTAL: 142 MG/DL (ref 0–199)
CO2: 26 MMOL/L (ref 21–32)
CREAT SERPL-MCNC: 0.7 MG/DL (ref 0.9–1.3)
EOSINOPHILS ABSOLUTE: 0.2 K/UL (ref 0–0.6)
EOSINOPHILS RELATIVE PERCENT: 3.6 %
GFR AFRICAN AMERICAN: >60
GFR NON-AFRICAN AMERICAN: >60
GLOBULIN: 3.2 G/DL
GLUCOSE BLD-MCNC: 87 MG/DL (ref 70–99)
HCT VFR BLD CALC: 40.7 % (ref 40.5–52.5)
HDLC SERPL-MCNC: 51 MG/DL (ref 40–60)
HEMOGLOBIN: 13.5 G/DL (ref 13.5–17.5)
IRON SATURATION: 27 % (ref 20–50)
IRON: 94 UG/DL (ref 59–158)
LDL CHOLESTEROL CALCULATED: 79 MG/DL
LYMPHOCYTES ABSOLUTE: 2 K/UL (ref 1–5.1)
LYMPHOCYTES RELATIVE PERCENT: 31.4 %
MAGNESIUM: 2.3 MG/DL (ref 1.8–2.4)
MCH RBC QN AUTO: 27.7 PG (ref 26–34)
MCHC RBC AUTO-ENTMCNC: 33.3 G/DL (ref 31–36)
MCV RBC AUTO: 83.4 FL (ref 80–100)
MONOCYTES ABSOLUTE: 0.5 K/UL (ref 0–1.3)
MONOCYTES RELATIVE PERCENT: 8.3 %
NEUTROPHILS ABSOLUTE: 3.5 K/UL (ref 1.7–7.7)
NEUTROPHILS RELATIVE PERCENT: 55.8 %
PDW BLD-RTO: 12.9 % (ref 12.4–15.4)
PLATELET # BLD: 286 K/UL (ref 135–450)
PMV BLD AUTO: 8.1 FL (ref 5–10.5)
POTASSIUM SERPL-SCNC: 4.2 MMOL/L (ref 3.5–5.1)
RBC # BLD: 4.88 M/UL (ref 4.2–5.9)
SODIUM BLD-SCNC: 140 MMOL/L (ref 136–145)
T3 FREE: 2.9 PG/ML (ref 2.3–4.2)
T4 FREE: 1.1 NG/DL (ref 0.9–1.8)
TOTAL IRON BINDING CAPACITY: 353 UG/DL (ref 260–445)
TOTAL PROTEIN: 7.3 G/DL (ref 6.4–8.2)
TRIGL SERPL-MCNC: 59 MG/DL (ref 0–150)
TSH REFLEX: 2.86 UIU/ML (ref 0.27–4.2)
TSH SERPL DL<=0.05 MIU/L-ACNC: 2.9 UIU/ML (ref 0.27–4.2)
VLDLC SERPL CALC-MCNC: 12 MG/DL
WBC # BLD: 6.3 K/UL (ref 4–11)

## 2018-08-09 PROCEDURE — 84443 ASSAY THYROID STIM HORMONE: CPT

## 2018-08-09 PROCEDURE — 84439 ASSAY OF FREE THYROXINE: CPT

## 2018-08-09 PROCEDURE — 82746 ASSAY OF FOLIC ACID SERUM: CPT

## 2018-08-09 PROCEDURE — 80053 COMPREHEN METABOLIC PANEL: CPT

## 2018-08-09 PROCEDURE — 83036 HEMOGLOBIN GLYCOSYLATED A1C: CPT

## 2018-08-09 PROCEDURE — 82306 VITAMIN D 25 HYDROXY: CPT

## 2018-08-09 PROCEDURE — 83550 IRON BINDING TEST: CPT

## 2018-08-09 PROCEDURE — 36415 COLL VENOUS BLD VENIPUNCTURE: CPT

## 2018-08-09 PROCEDURE — 80061 LIPID PANEL: CPT

## 2018-08-09 PROCEDURE — 84481 FREE ASSAY (FT-3): CPT

## 2018-08-09 PROCEDURE — 83735 ASSAY OF MAGNESIUM: CPT

## 2018-08-09 PROCEDURE — 83540 ASSAY OF IRON: CPT

## 2018-08-09 PROCEDURE — 82607 VITAMIN B-12: CPT

## 2018-08-09 PROCEDURE — 85025 COMPLETE CBC W/AUTO DIFF WBC: CPT

## 2018-08-10 LAB
ESTIMATED AVERAGE GLUCOSE: 116.9 MG/DL
FOLATE: 11.31 NG/ML (ref 4.78–24.2)
HBA1C MFR BLD: 5.7 %
VITAMIN B-12: 701 PG/ML (ref 211–911)
VITAMIN D 25-HYDROXY: 30 NG/ML

## 2018-08-15 ENCOUNTER — OFFICE VISIT (OUTPATIENT)
Dept: BARIATRICS/WEIGHT MGMT | Age: 33
End: 2018-08-15

## 2018-08-15 VITALS
BODY MASS INDEX: 38.06 KG/M2 | RESPIRATION RATE: 16 BRPM | WEIGHT: 281 LBS | HEIGHT: 72 IN | SYSTOLIC BLOOD PRESSURE: 128 MMHG | DIASTOLIC BLOOD PRESSURE: 84 MMHG | HEART RATE: 70 BPM

## 2018-08-15 DIAGNOSIS — I10 HYPERTENSION, ESSENTIAL: ICD-10-CM

## 2018-08-15 DIAGNOSIS — Z98.84 S/P LAPAROSCOPIC SLEEVE GASTRECTOMY: Primary | ICD-10-CM

## 2018-08-15 DIAGNOSIS — K76.0 FATTY LIVER: ICD-10-CM

## 2018-08-15 DIAGNOSIS — G47.33 SLEEP APNEA, OBSTRUCTIVE: ICD-10-CM

## 2018-08-15 DIAGNOSIS — K21.9 CHRONIC GERD: ICD-10-CM

## 2018-08-15 PROCEDURE — G8417 CALC BMI ABV UP PARAM F/U: HCPCS | Performed by: NURSE PRACTITIONER

## 2018-08-15 PROCEDURE — G8427 DOCREV CUR MEDS BY ELIG CLIN: HCPCS | Performed by: NURSE PRACTITIONER

## 2018-08-15 PROCEDURE — 1036F TOBACCO NON-USER: CPT | Performed by: NURSE PRACTITIONER

## 2018-08-15 PROCEDURE — 99213 OFFICE O/P EST LOW 20 MIN: CPT | Performed by: NURSE PRACTITIONER

## 2018-08-15 ASSESSMENT — ENCOUNTER SYMPTOMS
RESPIRATORY NEGATIVE: 1
ALLERGIC/IMMUNOLOGIC NEGATIVE: 1
GASTROINTESTINAL NEGATIVE: 1
EYES NEGATIVE: 1

## 2018-08-15 NOTE — PROGRESS NOTES
[facility]) Physicians   [facility] [employer]    Subjective:      Patient ID: Sav Mcadams is a 35 y.o. male    HPI    9 months s/p sleeve gastrectomy    Sav Mcadams is a very pleasant 35 y.o. obese male , Body mass index is 38.11 kg/m². Patient denies any nausea, vomiting, fevers, chills, shortness of breath, chest pain, constipation or urinary symptoms. Denies any heartburn nor dysphagia. Past Medical History:   Diagnosis Date    GERD (gastroesophageal reflux disease)     Hypertension, essential 4/20/2017    Sleep apnea     USES BI-PAP     Past Surgical History:   Procedure Laterality Date    SLEEVE GASTRECTOMY  11/13/2017    LAPAROSCOPIC SLEEVE GASTRECTOMY -ETHICON, TRANS-ABDOMINAL PLANE BLOCK    UPPER GASTROINTESTINAL ENDOSCOPY  07/28/2017     Family History   Problem Relation Age of Onset    COPD Mother     Arthritis Mother     High Blood Pressure Father     High Cholesterol Father     High Blood Pressure Paternal Uncle     High Cholesterol Paternal Uncle     High Cholesterol Paternal Grandmother     Clotting Disorder Paternal Grandmother     High Blood Pressure Paternal Grandfather     High Cholesterol Paternal Grandfather      Social History   Substance Use Topics    Smoking status: Never Smoker    Smokeless tobacco: Former User    Alcohol use Yes      Comment: occassional drinker     I counseled the patient on the importance of not smoking and risks of ETOH. No Known Allergies  Vitals:    08/15/18 1251   BP: 128/84   Pulse: 70   Resp: 16   Weight: 281 lb (127.5 kg)   Height: 6' (1.829 m)     Body mass index is 38.11 kg/m². Current Outpatient Prescriptions:     Multiple Vitamins-Minerals (BARIATRIC FUSION) CHEW, Take 3 tablets by mouth daily, Disp: , Rfl:     Review of Systems   Constitutional: Negative. HENT: Negative. Eyes: Negative. Respiratory: Negative. Cardiovascular: Negative. Gastrointestinal: Negative. Endocrine: Negative.

## 2018-08-15 NOTE — PROGRESS NOTES
Dietary Assessment Note    Vitals:   Vitals:    08/15/18 1251   BP: 128/84   Pulse: 70   Resp: 16   Weight: 281 lb (127.5 kg)   Height: 6' (1.829 m)    Patient lost 8 lbs over past 3 months. Total Weight Loss: 85.8 lbs    Labs reviewed: Results for Sowmya Garcia (MRN [id number]) as of 8/15/2018 12:55   Ref. Range 8/9/2018 10:29   Sodium Latest Ref Range: 136 - 145 mmol/L 140   Potassium Latest Ref Range: 3.5 - 5.1 mmol/L 4.2   Chloride Latest Ref Range: 99 - 110 mmol/L 104   CO2 Latest Ref Range: 21 - 32 mmol/L 26   BUN Latest Ref Range: 7 - 20 mg/dL 17   Creatinine Latest Ref Range: 0.9 - 1.3 mg/dL 0.7 (L)   Anion Gap Latest Ref Range: 3 - 16  10   GFR Non- Latest Ref Range: >60  >60   GFR  Latest Ref Range: >60  >60   Glucose Latest Ref Range: 70 - 99 mg/dL 87   Calcium Latest Ref Range: 8.3 - 10.6 mg/dL 9.5   Total Protein Latest Ref Range: 6.4 - 8.2 g/dL 7.3     Results for Sowmya Garcia (MRN [id number]) as of 8/15/2018 12:55   Ref. Range 8/9/2018 15:21   Cholesterol, Total Latest Ref Range: 0 - 199 mg/dL 142   HDL Cholesterol Latest Ref Range: 40 - 60 mg/dL 51   LDL Calculated Latest Ref Range: <100 mg/dL 79   Triglycerides Latest Ref Range: 0 - 150 mg/dL 59   VLDL Cholesterol Calculated Latest Ref Range: Not Established mg/dL 12   Results for Sowmya Garcia (MRN [id number]) as of 8/15/2018 12:55   Ref. Range 8/9/2018 15:22   Hemoglobin A1C Latest Ref Range: See comment % 5.7       Protein intake: 60-80 grams/day     Fluid intake: >64 oz/day    Multivitamin/mineral intake: fusion 6 x day     Calcium intake: none    Other: none     Exercise: Yes. 2 x week Cardio for 30-45 minutes, on feet a lot at work at [employer] steps/day)    Nutrition Assessment: 9 mo s/p sleeve post-op visit. 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      Breakfast: 4:30 am might have another protein shake or leftovers from dinner OR toast and tuna salad     Snack: none     Lunch: 1 pm - yogurt

## 2018-09-06 ENCOUNTER — OFFICE VISIT (OUTPATIENT)
Dept: FAMILY MEDICINE CLINIC | Age: 33
End: 2018-09-06

## 2018-09-06 VITALS
OXYGEN SATURATION: 96 % | HEART RATE: 69 BPM | RESPIRATION RATE: 14 BRPM | DIASTOLIC BLOOD PRESSURE: 80 MMHG | BODY MASS INDEX: 37.93 KG/M2 | WEIGHT: 280 LBS | HEIGHT: 72 IN | SYSTOLIC BLOOD PRESSURE: 124 MMHG

## 2018-09-06 DIAGNOSIS — Z00.00 ANNUAL PHYSICAL EXAM: Primary | ICD-10-CM

## 2018-09-06 PROCEDURE — 99395 PREV VISIT EST AGE 18-39: CPT | Performed by: FAMILY MEDICINE

## 2018-09-06 ASSESSMENT — ENCOUNTER SYMPTOMS
APNEA: 1
SORE THROAT: 0
ABDOMINAL PAIN: 0
SHORTNESS OF BREATH: 0
DIARRHEA: 0
EYE ITCHING: 0
BACK PAIN: 0
CONSTIPATION: 0
RHINORRHEA: 0
EYE PAIN: 0

## 2018-09-06 ASSESSMENT — PATIENT HEALTH QUESTIONNAIRE - PHQ9
SUM OF ALL RESPONSES TO PHQ QUESTIONS 1-9: 0
1. LITTLE INTEREST OR PLEASURE IN DOING THINGS: 0
SUM OF ALL RESPONSES TO PHQ QUESTIONS 1-9: 0
2. FEELING DOWN, DEPRESSED OR HOPELESS: 0
SUM OF ALL RESPONSES TO PHQ9 QUESTIONS 1 & 2: 0

## 2018-09-06 NOTE — PROGRESS NOTES
sleep disturbance. The patient is not nervous/anxious. Physical Exam:   Vitals:    09/06/18 0802   BP: 124/80   Site: Right Arm   Position: Sitting   Cuff Size: Medium Adult   Pulse: 69   Resp: 14   SpO2: 96%   Weight: 280 lb (127 kg)   Height: 6' (1.829 m)     Body mass index is 37.97 kg/m². Physical Exam   Constitutional: He is oriented to person, place, and time. He appears well-developed and well-nourished. HENT:   Head: Normocephalic and atraumatic. Right Ear: External ear normal.   Left Ear: External ear normal.   Nose: Nose normal.   Mouth/Throat: Oropharynx is clear and moist. No oropharyngeal exudate. Eyes: Pupils are equal, round, and reactive to light. EOM are normal. Right eye exhibits no discharge. Left eye exhibits no discharge. Neck: Normal range of motion. No thyromegaly present. Cardiovascular: Normal rate, regular rhythm, normal heart sounds and intact distal pulses. Pulmonary/Chest: Effort normal and breath sounds normal. He has no wheezes. Abdominal: Soft. Bowel sounds are normal. There is no tenderness. Musculoskeletal: Normal range of motion. Bilateral UE/LE normal ROM   Neurological: He is alert and oriented to person, place, and time. He has normal reflexes. He displays normal reflexes. No cranial nerve deficit. He exhibits normal muscle tone. Coordination normal.   Skin: Skin is warm and dry. Psychiatric: He has a normal mood and affect. His behavior is normal. Judgment and thought content normal.       Assessment/Plan:   Diagnosis Orders   1. Annual physical exam       Return in about 1 year (around 9/6/2019) for Physical Exam.    Prior to Visit Medications    Medication Sig Taking?  Authorizing Provider   Multiple Vitamins-Minerals (BARIATRIC FUSION) CHEW Take 3 tablets by mouth daily Yes Historical Provider, MD

## 2018-11-20 ENCOUNTER — OFFICE VISIT (OUTPATIENT)
Dept: BARIATRICS/WEIGHT MGMT | Age: 33
End: 2018-11-20
Payer: COMMERCIAL

## 2018-11-20 VITALS
WEIGHT: 283.6 LBS | BODY MASS INDEX: 38.41 KG/M2 | DIASTOLIC BLOOD PRESSURE: 88 MMHG | SYSTOLIC BLOOD PRESSURE: 122 MMHG | HEART RATE: 90 BPM | RESPIRATION RATE: 18 BRPM | HEIGHT: 72 IN

## 2018-11-20 DIAGNOSIS — E66.01 MORBID OBESITY WITH BMI OF 40.0-44.9, ADULT (HCC): Primary | ICD-10-CM

## 2018-11-20 DIAGNOSIS — Z98.84 S/P LAPAROSCOPIC SLEEVE GASTRECTOMY: ICD-10-CM

## 2018-11-20 DIAGNOSIS — K76.0 FATTY LIVER: ICD-10-CM

## 2018-11-20 DIAGNOSIS — K21.9 CHRONIC GERD: ICD-10-CM

## 2018-11-20 DIAGNOSIS — G47.33 SLEEP APNEA, OBSTRUCTIVE: ICD-10-CM

## 2018-11-20 DIAGNOSIS — I10 HYPERTENSION, ESSENTIAL: ICD-10-CM

## 2018-11-20 PROCEDURE — G8417 CALC BMI ABV UP PARAM F/U: HCPCS | Performed by: SURGERY

## 2018-11-20 PROCEDURE — G8484 FLU IMMUNIZE NO ADMIN: HCPCS | Performed by: SURGERY

## 2018-11-20 PROCEDURE — 1036F TOBACCO NON-USER: CPT | Performed by: SURGERY

## 2018-11-20 PROCEDURE — 99213 OFFICE O/P EST LOW 20 MIN: CPT | Performed by: SURGERY

## 2018-11-20 PROCEDURE — G8427 DOCREV CUR MEDS BY ELIG CLIN: HCPCS | Performed by: SURGERY

## 2018-11-20 NOTE — PROGRESS NOTES
Dietary Assessment Note    Vitals:   Vitals:    11/20/18 1023 11/20/18 1029   BP:  (!) 134/94   Pulse:  90   Resp:  18   Weight:  283 lb 9.6 oz (128.6 kg)   Height: 6' (1.829 m) 6' (1.829 m)    Patient gained 2.6 lbs over 3 months. Total Weight Loss: 83.2 lbs    Labs reviewed: no lab studies available for review at time of visit    Protein intake: 60-80 grams/day     Fluid intake: >64 oz/day water, powerade zero, decaf coffee/unsweetened tea    Multivitamin/mineral intake: Fusion - how many/day: 2 AM + 2 PM    Calcium intake: none    Other: none    Exercise: Yes. 2 days cardio for 40 minutes treadmill/eliptical + weight lifting 1 day/week    Nutrition Assessment: 1 year post-op visit. Not tracking at this. Working 3rd shift 6PM-7AM. Wake up around 2:30 PM   Breakfast: 3:30 PM: 2 HB eggs OR sting cheese + lunch meat OR Lunch able w/ turkey/cheddar + 4-5 crackers (110 calories, 11 g protein)    Snack:6 PM: Nectar shake made w/ water    Lunch: 11:30-midnight: Progress Light Soup 1/2-3/4 can (zesty chix veggie, juliet fe  chix +veggie, beef stew) + sometimes broccoli/carrots or cutie orange    Snack: 5 AM: Leftover soup + decaf coffee    Dinner: Home 7:30 AM -bed OR string cheese or ogange    Amount able to eat per sitting: ~6 oz    Following 30/30/30 rule: Eating in 15-20 minutes. Avoids eating and drinking at the same time.     Food Intolerances/issues: whole milk    Client Concerns: none    Goals:   - Continue current eating patterns including protein with all meals and snacks  - Start tracking 3-4 days/week - 1200 calories and 60-80 grams/protein  - Continue current exercise routine and increase intensity as able    Plan: Follow up at 1 year 6 months post op and as needed    [employer] [place]
A/P:    Mason Maradiaga was seen today for bariatrics post op follow up. Diagnoses and all orders for this visit:    Morbid obesity with BMI of 40.0-44.9, adult ([facility] [address]  -     CBC Auto Differential; Future  -     Comprehensive Metabolic Panel; Future  -     Lipid Panel; Future  -     TSH with Reflex; Future  -     Iron and TIBC; Future  -     Vitamin B12 & Folate; Future  -     Vitamin D 25 Hydroxy; Future  -     Hemoglobin A1C; Future    S/P laparoscopic sleeve gastrectomy  -     CBC Auto Differential; Future  -     Comprehensive Metabolic Panel; Future  -     Lipid Panel; Future  -     TSH with Reflex; Future  -     Iron and TIBC; Future  -     Vitamin B12 & Folate; Future  -     Vitamin D 25 Hydroxy; Future  -     Hemoglobin A1C; Future    Chronic GERD  -     CBC Auto Differential; Future  -     Comprehensive Metabolic Panel; Future  -     Lipid Panel; Future  -     TSH with Reflex; Future  -     Iron and TIBC; Future  -     Vitamin B12 & Folate; Future  -     Vitamin D 25 Hydroxy; Future  -     Hemoglobin A1C; Future    Hypertension, essential  -     CBC Auto Differential; Future  -     Comprehensive Metabolic Panel; Future  -     Lipid Panel; Future  -     TSH with Reflex; Future  -     Iron and TIBC; Future  -     Vitamin B12 & Folate; Future  -     Vitamin D 25 Hydroxy; Future  -     Hemoglobin A1C; Future    Sleep apnea, obstructive  -     CBC Auto Differential; Future  -     Comprehensive Metabolic Panel; Future  -     Lipid Panel; Future  -     TSH with Reflex; Future  -     Iron and TIBC; Future  -     Vitamin B12 & Folate; Future  -     Vitamin D 25 Hydroxy; Future  -     Hemoglobin A1C; Future    Fatty liver  -     CBC Auto Differential; Future  -     Comprehensive Metabolic Panel; Future  -     Lipid Panel; Future  -     TSH with Reflex; Future  -     Iron and TIBC; Future  -     Vitamin B12 & Folate; Future  -     Vitamin D 25 Hydroxy; Future  -     Hemoglobin A1C; Future          Tien Hudson is 35 y.o.

## 2019-01-24 ENCOUNTER — TELEPHONE (OUTPATIENT)
Dept: PULMONOLOGY | Age: 34
End: 2019-01-24

## 2019-04-22 ENCOUNTER — TELEPHONE (OUTPATIENT)
Dept: PULMONOLOGY | Age: 34
End: 2019-04-22

## 2019-04-22 ENCOUNTER — OFFICE VISIT (OUTPATIENT)
Dept: PULMONOLOGY | Age: 34
End: 2019-04-22
Payer: COMMERCIAL

## 2019-04-22 VITALS
WEIGHT: 294 LBS | HEART RATE: 72 BPM | RESPIRATION RATE: 16 BRPM | DIASTOLIC BLOOD PRESSURE: 85 MMHG | HEIGHT: 72 IN | SYSTOLIC BLOOD PRESSURE: 133 MMHG | BODY MASS INDEX: 39.82 KG/M2 | TEMPERATURE: 98.3 F | OXYGEN SATURATION: 96 %

## 2019-04-22 DIAGNOSIS — G47.33 SLEEP APNEA, OBSTRUCTIVE: Primary | ICD-10-CM

## 2019-04-22 DIAGNOSIS — E66.9 OBESITY (BMI 30-39.9): ICD-10-CM

## 2019-04-22 DIAGNOSIS — Z71.89 ENCOUNTER FOR BIPAP USE COUNSELING: ICD-10-CM

## 2019-04-22 PROCEDURE — 99213 OFFICE O/P EST LOW 20 MIN: CPT | Performed by: NURSE PRACTITIONER

## 2019-04-22 PROCEDURE — G8417 CALC BMI ABV UP PARAM F/U: HCPCS | Performed by: NURSE PRACTITIONER

## 2019-04-22 PROCEDURE — 1036F TOBACCO NON-USER: CPT | Performed by: NURSE PRACTITIONER

## 2019-04-22 PROCEDURE — G8427 DOCREV CUR MEDS BY ELIG CLIN: HCPCS | Performed by: NURSE PRACTITIONER

## 2019-04-22 ASSESSMENT — SLEEP AND FATIGUE QUESTIONNAIRES
ESS TOTAL SCORE: 3
HOW LIKELY ARE YOU TO NOD OFF OR FALL ASLEEP IN A CAR, WHILE STOPPED FOR A FEW MINUTES IN TRAFFIC: 0
HOW LIKELY ARE YOU TO NOD OFF OR FALL ASLEEP WHILE WATCHING TV: 0
NECK CIRCUMFERENCE (INCHES): 16.5
HOW LIKELY ARE YOU TO NOD OFF OR FALL ASLEEP WHILE SITTING QUIETLY AFTER LUNCH WITHOUT ALCOHOL: 0
HOW LIKELY ARE YOU TO NOD OFF OR FALL ASLEEP WHILE SITTING INACTIVE IN A PUBLIC PLACE: 0
HOW LIKELY ARE YOU TO NOD OFF OR FALL ASLEEP WHILE SITTING AND READING: 1
HOW LIKELY ARE YOU TO NOD OFF OR FALL ASLEEP WHILE LYING DOWN TO REST IN THE AFTERNOON WHEN CIRCUMSTANCES PERMIT: 1
HOW LIKELY ARE YOU TO NOD OFF OR FALL ASLEEP WHEN YOU ARE A PASSENGER IN A CAR FOR AN HOUR WITHOUT A BREAK: 1
HOW LIKELY ARE YOU TO NOD OFF OR FALL ASLEEP WHILE SITTING AND TALKING TO SOMEONE: 0

## 2019-04-22 NOTE — PATIENT INSTRUCTIONS
Remember to bring all pulmonary medications to your next appointment with the office. Please keep all of your future appointments scheduled by [address], [facility] office. Out of respect for other patients and providers, you may be asked to reschedule your appointment if you arrive later than your scheduled appointment time. Appointments cancelled less than 24hrs in advance will be considered a no show. Patients with three missed appointments within 1 year or four missed appointments within 2 years can be dismissed from the practice. Sleep Hygiene. .. Tips for better sleep. .. Avoid naps. This will ensure you are sleepy at bedtime. If you have to take a nap, sleep less than 1 hour, before 3 pm.  Sleep only when sleepy; this reduces the time you are awake in bed. Regular exercise is recommended to help you deepen your sleep, but not within 4-6 hours of your bedtime. Timing of exercise is important, aim to exercise early in the morning or early afternoon. A light snack may help you fall asleep. Warm milk and foods high in the amino acid tryptophan, such as bananas, may help you to sleep  Be sure to avoid heavy, spicy or sugary foods 4-6 hours before bedtime and avoid at snack time. Stay away from stimulants such as caffeine and nicotine for at least 4-6 hours before bed. Stimulants can interfere with your ability to fall asleep. Caffeine is found in tea, cola, coffee, cocoa and chocolate and is best avoided at bedtime. Nicotine is found in tobacco products. Avoid alcohol 4-6 hours before bedtime. Alcohol has an immediate sleep-inducing effect, after a few hours when alcohol levels fall there is a stimulant or wake-up effect and will cause fragmented sleep. Sleep rituals are important. Give your body clues it is time to slow down and sleep. Examples include; yoga, deep breathing, listen to relaxing music, a hot bath or a few minutes of reading.   Have a fixed bedtime and awakening time, Even on weekends! You will feel better keeping a regular sleep cycle, even if you are retired or not working. Get into your favorite sleep position. If not asleep in 30 minutes, get up and do something boring until you feel sleepy. Remember not to expose yourself to bright lights such as TV, phone or tablet screens. Only use your bed for sleeping. Do not use your bed as an office, workroom or recreation room. Use comfortable bedding. Uncomfortable bedding can prevent good sleep. Ensure your bedroom is quiet and comfortable. A cooler room along with enough blankets to stay warm is recommended. If your room is too noisy, try a white noise machine. If too bright, try black out shades or an eye mask. Dont take worries to bed. Leave worries about work, school etc. behind you when you go to bed. Some people find it helpful to assign a worry period in the evening or late afternoon to write down your worries and get them out of your system. CPAP Equipment Cleaning and Disinfecting Schedule  Equipment Cleaning Frequency Instructions  Disinfecting Frequency   Non-Disposable Filters  Weekly Mild soapy water, Rinse, Air Dry Not Required   Disposable Filters Change as needed  2-4 weeks Do Not Wash Not Required   Hose/tubing Daily Mild soapy water, Rinse, Air Dry Once a week   Mask / Nasal Pillows Daily Mild soapy water, Rinse, Air Dry Once a week   Headgear Weekly Hand wash, Mild soapy water, Rinse, Dry  Not Required   Humidifier Daily Empty water daily  Mild soapy water, Rinse well, Air Dry  Once a week   CPAP Unit As Needed Dust with damp cloth,  No detergents or sprays Not Required         Disinfect (per schedule) with 1 part white vinegar and 3 parts water- soak mask and water chamber for 30 minutes every 1-2 weeks, more often if sick. Allow water/vinegar mixture to run through tubing. Allow all equipment to air dry.    Drying Hints:   Always hang tubing away from direct sunlight, as this will cause the tubing to become yellow, brittle and crack over a period of time. DO NOT attach the wet tubing to your CPAP unit to blow-dry it. The moisture from the tubing can drain back into your machine. Moisture in your unit can cause sudden pressure increases or short circuits  DO's and DON'Ts:  - Don't use alcohol-based products to clean your mask, because it can cause the materials to become hard and brittle. - Don't put headgear in the washer or dryer  - Don't use any caustic or household cleaning solutions such as bleach on your CPAP   equipment.  - Do follow the recommended cleaning schedule. - Do change your disposable filter frequently. Adapted From: [email or web address]/cleaning. shtm.   These are general suggestions for all models please follow specific s recommendations and specific instructions

## 2019-04-22 NOTE — PROGRESS NOTES
MA Communication: The following orders are received by verbal communication from YOSEPH Chau.     Orders include:  Orders faxed to Sanam ALEMAN       3 mo fu scheduled 8/16/19       30 day compliance (see phone note)

## 2019-04-22 NOTE — PROGRESS NOTES
CHIEF COMPLAINT: DEIDRA follow up    Consulting provider: Self referral     Andrzej Walters is a 29 y.o. male in office for DEIDRA follow up. States he took a 3rd shift job and he is having a hard time using BIPAP as much. He has also has lost 100 lbs since bariatric weight loss surgery. States he is still snoring without BIPAP. Patient is using BiPAP  5-8 hrs/night. Using humidifier. No snoring on BiPAP. The pressure feels high. The mask is comfortable-nasal mask. No mask leak. No significant daytime sleepiness. No nodding off when driving. No dry nose or throat. No fatigue. Bedtime is 8 am and rise time is 130 pm. Sleep onset is 10-15 minutes. Wakes up 0 times at night total. 0 nocturia. No naps during the day. Sometimes headache in am. No weight gain. No caffienated beverages during the day. No alcohol. ESS is 3      Past Medical History:   Diagnosis Date    GERD (gastroesophageal reflux disease)     Hypertension, essential 4/20/2017    Sleep apnea     USES BI-PAP     Past Surgical History:   Procedure Laterality Date    SLEEVE GASTRECTOMY  11/13/2017    LAPAROSCOPIC SLEEVE GASTRECTOMY -algranoCON, TRANS-ABDOMINAL PLANE BLOCK    UPPER GASTROINTESTINAL ENDOSCOPY  07/28/2017     No Known Allergies    Current Outpatient Medications   Medication Sig Dispense Refill    Multiple Vitamins-Minerals (BARIATRIC FUSION) CHEW Take 3 tablets by mouth daily       No current facility-administered medications for this visit. Objective:   PHYSICAL EXAM:    Blood pressure 133/85, pulse 72, temperature 98.3 °F (36.8 °C), temperature source Oral, resp. rate 16, height 6' (1.829 m), weight 294 lb (133.4 kg), SpO2 96 %.' on RA  Room air  Gen: No acute distress. Obese. BMI of 39.87  Eyes: PERRL. No sclera icterus. No conjunctival injection. ENT: No discharge. Pharynx clear. Mallampati class III. Neck: Trachea midline. No obvious mass. Neck circumference 16.5\"  Resp: No accessory muscle use. No crackles. No wheezes.  No rhonchi. CV: Regular rate. Regular rhythm. No murmur or rub. Skin: Warm and dry. M/S: No cyanosis. No obvious joint deformity. Neuro: Awake. Alert. Moves all four extremities. Psych: Oriented x 3. No anxiety. DATA:   Split night 2/6/2015 AHI 26.4 CPAP 14 cmH2O   Bipap titration 8/2016 improved on bipap pressures    BIPAP compliance data:  Compliance download report from 12/24/16 to 1/22/17  showed patient is using machine 5:26 hrs/night with 70% compliance and AHI 0.7 within this time frame. 21/30days with greater than 4 hours of machine use. 90% pressure 14.3/10.2 cm H20    Compliance download report from 12/27/17 to 1/25/17 showed patient is using machine 6:23 hrs/night with 76.7% compliance and AHI 1.3 within this time frame. 23/30days with greater than 4 hours of machine use. 90% pressure 14.3/10.4 cm H20    Compliance download report from 3/23/19 to 4/21/19 showed patient is using machine 5:52 hrs/night with 16.7% compliance and AHI 1.5 within this time frame. 4/30days with greater than 4 hours of machine use. 90% pressure 15/11.3 cm H20    Assessment:       · Moderate DEIDRA. Failed CPAP 16 cmH2O. Nasal mask. Auto bipap Min EPAP 10 IPAP max 20 and PS 3-5. Sub Optimal compliance on review today. · Snoring- resolved on BIPAP   · Witnessed apnea-resolved on BIPAP  · Obesity         Plan:      · Changed to auto bipap Min EPAP 8 IPAP max 20 and PS 3-5  · Consider repeat titration or ONPO on BIPAP if no improvement  · Advised to use BiPAP 6-8 hrs at night- need to increase use. Discussed importance of routine  · Recommend at least 7 preferably 8 hours of sleep nightly with BIPAP  · Replacement of mask, tubing, head straps every 3-6 months or sooner if damaged. · Patient instructed to contact [employer] for any mask, tubing or machine trouble shooting if problems arise. · Sleep hygiene  · Avoid sedatives, alcohol and caffeinated drinks at bed time.   · No driving motorized vehicles or operating heavy machinery while fatigue, drowsy or sleepy. · Weight loss is also recommended as a long-term intervention. Patient down about 60 lbs since last year- had bariatric weight loss surgery. Congratulated and encouraged to continue  · Complications of DEIDRA if not treated were discussed with patient patient to include systemic hypertension, pulmonary hypertension, cardiovascular morbidities, car accidents and all cause mortality.   · Patient education handout provided regarding sleep tips and PAP cleaning recommendations     Follow up 2-3 months, sooner if needed

## 2019-06-13 NOTE — TELEPHONE ENCOUNTER
Spoke with Cheli at [facility] she is going to check with RT regarding compliance report and return call to office.

## 2019-06-20 PROBLEM — E66.01 SEVERE OBESITY (BMI 35.0-39.9) WITH COMORBIDITY (HCC): Status: ACTIVE | Noted: 2019-06-20

## 2019-07-16 ENCOUNTER — TELEPHONE (OUTPATIENT)
Dept: PULMONOLOGY | Age: 34
End: 2019-07-16

## 2019-08-26 ENCOUNTER — OFFICE VISIT (OUTPATIENT)
Dept: PULMONOLOGY | Age: 34
End: 2019-08-26
Payer: COMMERCIAL

## 2019-08-26 VITALS
HEART RATE: 75 BPM | DIASTOLIC BLOOD PRESSURE: 88 MMHG | SYSTOLIC BLOOD PRESSURE: 126 MMHG | RESPIRATION RATE: 16 BRPM | OXYGEN SATURATION: 97 % | BODY MASS INDEX: 40.23 KG/M2 | WEIGHT: 297 LBS | HEIGHT: 72 IN

## 2019-08-26 DIAGNOSIS — G47.33 MODERATE OBSTRUCTIVE SLEEP APNEA: Primary | ICD-10-CM

## 2019-08-26 DIAGNOSIS — Z71.89 ENCOUNTER FOR BIPAP USE COUNSELING: ICD-10-CM

## 2019-08-26 DIAGNOSIS — E66.01 MORBID OBESITY WITH BMI OF 40.0-44.9, ADULT (HCC): ICD-10-CM

## 2019-08-26 PROCEDURE — 1036F TOBACCO NON-USER: CPT | Performed by: NURSE PRACTITIONER

## 2019-08-26 PROCEDURE — 99213 OFFICE O/P EST LOW 20 MIN: CPT | Performed by: NURSE PRACTITIONER

## 2019-08-26 PROCEDURE — G8427 DOCREV CUR MEDS BY ELIG CLIN: HCPCS | Performed by: NURSE PRACTITIONER

## 2019-08-26 PROCEDURE — G8417 CALC BMI ABV UP PARAM F/U: HCPCS | Performed by: NURSE PRACTITIONER

## 2019-08-26 ASSESSMENT — SLEEP AND FATIGUE QUESTIONNAIRES
HOW LIKELY ARE YOU TO NOD OFF OR FALL ASLEEP WHILE LYING DOWN TO REST IN THE AFTERNOON WHEN CIRCUMSTANCES PERMIT: 1
ESS TOTAL SCORE: 2
HOW LIKELY ARE YOU TO NOD OFF OR FALL ASLEEP WHILE SITTING AND READING: 1
HOW LIKELY ARE YOU TO NOD OFF OR FALL ASLEEP WHILE WATCHING TV: 0
HOW LIKELY ARE YOU TO NOD OFF OR FALL ASLEEP IN A CAR, WHILE STOPPED FOR A FEW MINUTES IN TRAFFIC: 0
HOW LIKELY ARE YOU TO NOD OFF OR FALL ASLEEP WHILE SITTING INACTIVE IN A PUBLIC PLACE: 0
HOW LIKELY ARE YOU TO NOD OFF OR FALL ASLEEP WHILE SITTING QUIETLY AFTER LUNCH WITHOUT ALCOHOL: 0
NECK CIRCUMFERENCE (INCHES): 16
HOW LIKELY ARE YOU TO NOD OFF OR FALL ASLEEP WHEN YOU ARE A PASSENGER IN A CAR FOR AN HOUR WITHOUT A BREAK: 0
HOW LIKELY ARE YOU TO NOD OFF OR FALL ASLEEP WHILE SITTING AND TALKING TO SOMEONE: 0

## 2019-08-26 NOTE — PROGRESS NOTES
CHIEF COMPLAINT: DEIDRA follow up    Consulting provider: Self referral     Dawna Chambers is a 29 y.o. male in office for DEIDRA follow up. States now back on day shift and using BIPAP more. States pressure decrease has been good. Patient is using BiPAP   7hrs/night. Sometimes using humidifier. No snoring on BiPAP. The pressure is well tolerated. The mask is comfortable- nasal mask. No mask leak. No significant daytime sleepiness. No nodding off when driving. No dry nose or throat. No fatigue. Bedtime is 10 pm and rise time is 530 am. Sleep onset is 10 minutes. Wakes up 0 times at night total. No nocturia. No naps during the day. No headache in am. No weight gain. No caffienated beverages during the day. No alcohol. ESS is 2      Past Medical History:   Diagnosis Date    GERD (gastroesophageal reflux disease)     Hypertension, essential 4/20/2017    Sleep apnea     USES BI-PAP     Past Surgical History:   Procedure Laterality Date    SLEEVE GASTRECTOMY  11/13/2017    LAPAROSCOPIC SLEEVE GASTRECTOMY -ETHICON, TRANS-ABDOMINAL PLANE BLOCK    UPPER GASTROINTESTINAL ENDOSCOPY  07/28/2017     No Known Allergies    Current Outpatient Medications   Medication Sig Dispense Refill    Multiple Vitamins-Minerals (BARIATRIC FUSION) CHEW Take 3 tablets by mouth daily       No current facility-administered medications for this visit. Objective:   PHYSICAL EXAM:    Blood pressure 126/88, pulse 75, resp. rate 16, height 6' (1.829 m), weight 297 lb (134.7 kg), SpO2 97 %.' on RA  Room air  Gen: No acute distress. Obese. BMI of 40.28  Eyes: PERRL. No sclera icterus. No conjunctival injection. ENT: No discharge. Pharynx clear. Mallampati class III. Neck: Trachea midline. No obvious mass. Neck circumference 16\"  Resp: No accessory muscle use. No crackles. No wheezes. No rhonchi. CV: Regular rate. Regular rhythm. No murmur or rub. Skin: Warm and dry. M/S: No cyanosis. No obvious joint deformity. Neuro: Awake. Alert. operating heavy machinery while fatigue, drowsy or sleepy. · Weight loss is also recommended as a long-term intervention. Patient down about 60 lbs since last year- had bariatric weight loss surgery. Congratulated and encouraged to continue  · Complications of DEIDRA if not treated were discussed with patient patient to include systemic hypertension, pulmonary hypertension, cardiovascular morbidities, car accidents and all cause mortality.   · Patient education handout provided regarding sleep tips and PAP cleaning recommendations     Follow up 1 year, sooner if needed

## 2019-08-26 NOTE — PATIENT INSTRUCTIONS
Remember to bring all pulmonary medications to your next appointment with the office. Please keep all of your future appointments scheduled by [address], [facility] office. Out of respect for other patients and providers, you may be asked to reschedule your appointment if you arrive later than your scheduled appointment time. Appointments cancelled less than 24hrs in advance will be considered a no show. Patients with three missed appointments within 1 year or four missed appointments within 2 years can be dismissed from the practice. Sleep Hygiene. .. Tips for better sleep. .. Avoid naps. This will ensure you are sleepy at bedtime. If you have to take a nap, sleep less than 1 hour, before 3 pm.  Sleep only when sleepy; this reduces the time you are awake in bed. Regular exercise is recommended to help you deepen your sleep, but not within 4-6 hours of your bedtime. Timing of exercise is important, aim to exercise early in the morning or early afternoon. A light snack may help you fall asleep. Warm milk and foods high in the amino acid tryptophan, such as bananas, may help you to sleep  Be sure to avoid heavy, spicy or sugary foods 4-6 hours before bedtime and avoid at snack time. Stay away from stimulants such as caffeine and nicotine for at least 4-6 hours before bed. Stimulants can interfere with your ability to fall asleep. Caffeine is found in tea, cola, coffee, cocoa and chocolate and is best avoided at bedtime. Nicotine is found in tobacco products. Avoid alcohol 4-6 hours before bedtime. Alcohol has an immediate sleep-inducing effect, after a few hours when alcohol levels fall there is a stimulant or wake-up effect and will cause fragmented sleep. Sleep rituals are important. Give your body clues it is time to slow down and sleep. Examples include; yoga, deep breathing, listen to relaxing music, a hot bath or a few minutes of reading.   Have a fixed bedtime and awakening time, Even on weekends! You will feel better keeping a regular sleep cycle, even if you are retired or not working. Get into your favorite sleep position. If not asleep in 30 minutes, get up and do something boring until you feel sleepy. Remember not to expose yourself to bright lights such as TV, phone or tablet screens. Only use your bed for sleeping. Do not use your bed as an office, workroom or recreation room. Use comfortable bedding. Uncomfortable bedding can prevent good sleep. Ensure your bedroom is quiet and comfortable. A cooler room along with enough blankets to stay warm is recommended. If your room is too noisy, try a white noise machine. If too bright, try black out shades or an eye mask. Dont take worries to bed. Leave worries about work, school etc. behind you when you go to bed. Some people find it helpful to assign a worry period in the evening or late afternoon to write down your worries and get them out of your system. CPAP Equipment Cleaning and Disinfecting Schedule  Equipment Cleaning Frequency Instructions  Disinfecting Frequency   Non-Disposable Filters  Weekly Mild soapy water, Rinse, Air Dry Not Required   Disposable Filters Change as needed  2-4 weeks Do Not Wash Not Required   Hose/tubing Daily Mild soapy water, Rinse, Air Dry Once a week   Mask / Nasal Pillows Daily Mild soapy water, Rinse, Air Dry Once a week   Headgear Weekly Hand wash, Mild soapy water, Rinse, Dry  Not Required   Humidifier Daily Empty water daily  Mild soapy water, Rinse well, Air Dry  Once a week   CPAP Unit As Needed Dust with damp cloth,  No detergents or sprays Not Required         Disinfect (per schedule) with 1 part white vinegar and 3 parts water- soak mask and water chamber for 30 minutes every 1-2 weeks, more often if sick. Allow water/vinegar mixture to run through tubing. Allow all equipment to air dry.    Drying Hints:   Always hang tubing away from direct sunlight, as this

## 2020-04-21 ENCOUNTER — TELEPHONE (OUTPATIENT)
Dept: BARIATRICS/WEIGHT MGMT | Age: 35
End: 2020-04-21

## 2020-04-21 NOTE — LETTER
Mike Velasco MD  [facility]  [address] [facility] [place], [address] [facility], [address] [facility]  [phone]  Phone  [phone]  Fax    Jj Martínez,    We noticed that you missed your last appointment. We are interested in your progress and how you have been feeling! As you know, it is important to complete regular follow-up visits to monitor your health after surgery and to insure the best success of your procedure. Please call the office today at [phone] to schedule an appointment. We look forward to seeing you!     Mike Velasco MD  [facility]  [address] [facility] [place], [address] [place] [address], [address] [facility]  [phone]  Phone  [phone]  Fax

## 2020-08-21 ENCOUNTER — TELEPHONE (OUTPATIENT)
Dept: PULMONOLOGY | Age: 35
End: 2020-08-21

## 2020-08-21 NOTE — TELEPHONE ENCOUNTER
Within this Telehealth Consent, the terms you and yours refer to the person using the Telehealth Service (Service), or in the case of a use of the Service by or on behalf of a minor, you and yours refer to and include (i) the parent or legal guardian who provides consent to the use of the Service by such minor or uses the Service on behalf of such minor, and (ii) the minor for whom consent is being provided or on whose behalf the Service is being utilized. When using Service, you will be consulting with your health care providers via the use of Telehealth.   Telehealth involves the delivery of healthcare services using electronic communications, information technology or other means between a healthcare provider and a patient who are not in the same physical location. Telehealth may be used for diagnosis, treatment, follow-up and/or patient education, and may include, but is not limited to, one or more of the following:    Electronic transmission of medical records, photo images, personal health information or other data between a patient and a healthcare provider    Interactions between a patient and healthcare provider via audio, video and/or data communications    Use of output data from medical devices, sound and video files    Anticipated Benefits   The use of Telehealth by your Provider(s) through the Service may have the following possible benefits:    Making it easier and more efficient for you to access medical care and treatment for the conditions treated by such Provider(s) utilizing the Service    Allowing you to obtain medical care and treatment by Provider(s) at times that are convenient for you    Enabling you to interact with Provider(s) without the necessity of an in-office appointment     Possible Risks   While the use of Telehealth can provide potential benefits for you, there are also potential risks associated with the use of Telehealth.  These risks include, but may not be limited to the following:    Your Provider(s) may not able to provide medical treatment for your particular condition and you may be required to seek alternative healthcare or emergency care services.  The electronic systems or other security protocols or safeguards used in the Service could fail, causing a breach of privacy of your medical or other information.  Given regulatory requirements in certain jurisdictions, your Provider(s) diagnosis and/or treatment options, especially pertaining to certain prescriptions, may be limited. Acceptance   1. You understand that Services will be provided via Telehealth. This process involves the use of HIPAA compliant and secure, real-time audio-visual interfacing with a qualified and appropriately trained provider located at [facility]. 2. You understand that, under no circumstances, will this session be recorded. 3. You understand that the Provider(s) at [facility] and other clinical participants will be party to the information obtained during the Telehealth session in accordance with best medical practices. 4. You understand that the information obtained during the Telehealth session will be used to help determine the most appropriate treatment options. 5. You understand that You have the right to revoke this consent at any point in time. 6. You understand that Telehealth is voluntary, and that continued treatment is not dependent upon consent. 7. You understand that, in the event of non-consent to Telehealth services and/or technical difficulties, you will obtain services as typically provided in the absence of Telehealth technology. 8. You understand that this consent will be kept in Your medical record. 9. No potential benefits from the use of Telehealth or specific results can be guaranteed. Your condition may not be cured or improved and, in some cases, may get worse.    10. There are limitations in the provision of medical care and treatment via Telehealth and the Service and you may not be able to receive diagnosis and/or treatment through the Service for every condition for which you seek diagnosis and/or treatment. 11. There are potential risks to the use of Telehealth, including but not limited to the risks described in this Telehealth Consent. 12. Your Provider(s) have discussed the use of Telehealth and the Service with you, including the benefits and risks of such and you have provided oral consent to your Provider(s) for the use of Telehealth and the Service. 15. You understand that it is your duty to provide your Provider(s) truthful, accurate and complete information, including all relevant information regarding care that you may have received or may be receiving from other healthcare providers outside of the Service. 14. You understand that each of your Provider(s) may determine in his or sole discretion that your condition is not suitable for diagnosis and/or treatment using the Service, and that you may need to seek medical care and treatment a specialist or other healthcare provider, outside of the Service. 15. You understand that you are fully responsible for payment for all services provided by Provider(s) or through use of the Service and that you may not be able to use third-party insurance. 16. You represent that (a) you have read this Telehealth Consent carefully, (b) you understand the risks and benefits of the Service and the use of Telehealth in the medical care and treatment provided to you by Provider(s) using the Service, and (c) you have the legal capacity and authority to provide this consent for yourself and/or the minor for which you are consenting under applicable federal and state laws, including laws relating to the age of [de-identified] and/or parental/guardian consent.    17. You give your informed consent to the use of Telehealth by Provider(s) using the Service under the terms described in the Terms of Service and this Telehealth Consent. The patient was read the following statement and has consented to the visit as of 8/21/20. The patient has been scheduled for their first telehealth visit on 8/31/20 with Gui Sandra CNP.

## 2020-08-31 ENCOUNTER — TELEPHONE (OUTPATIENT)
Dept: PULMONOLOGY | Age: 35
End: 2020-08-31

## 2020-10-05 ENCOUNTER — VIRTUAL VISIT (OUTPATIENT)
Dept: PULMONOLOGY | Age: 35
End: 2020-10-05
Payer: COMMERCIAL

## 2020-10-05 PROCEDURE — G8427 DOCREV CUR MEDS BY ELIG CLIN: HCPCS | Performed by: NURSE PRACTITIONER

## 2020-10-05 PROCEDURE — 99213 OFFICE O/P EST LOW 20 MIN: CPT | Performed by: NURSE PRACTITIONER

## 2020-10-05 ASSESSMENT — SLEEP AND FATIGUE QUESTIONNAIRES
HOW LIKELY ARE YOU TO NOD OFF OR FALL ASLEEP WHILE SITTING AND TALKING TO SOMEONE: 0
HOW LIKELY ARE YOU TO NOD OFF OR FALL ASLEEP WHEN YOU ARE A PASSENGER IN A CAR FOR AN HOUR WITHOUT A BREAK: 1
ESS TOTAL SCORE: 2
HOW LIKELY ARE YOU TO NOD OFF OR FALL ASLEEP WHILE WATCHING TV: 0
HOW LIKELY ARE YOU TO NOD OFF OR FALL ASLEEP IN A CAR, WHILE STOPPED FOR A FEW MINUTES IN TRAFFIC: 0
HOW LIKELY ARE YOU TO NOD OFF OR FALL ASLEEP WHILE LYING DOWN TO REST IN THE AFTERNOON WHEN CIRCUMSTANCES PERMIT: 1
HOW LIKELY ARE YOU TO NOD OFF OR FALL ASLEEP WHILE SITTING INACTIVE IN A PUBLIC PLACE: 0
HOW LIKELY ARE YOU TO NOD OFF OR FALL ASLEEP WHILE SITTING QUIETLY AFTER LUNCH WITHOUT ALCOHOL: 0
HOW LIKELY ARE YOU TO NOD OFF OR FALL ASLEEP WHILE SITTING AND READING: 0

## 2020-10-05 NOTE — PROGRESS NOTES
CHIEF COMPLAINT: DEIDRA follow up    Consulting provider: Self referral     Leah Sousa is a 28 y.o. male for televideo appointment via video and audio doxy. me virtual visit for DEIDRA follow up. States he is doing well with BiPAP. Patient is using BiPAP   6-7 hrs/night. Using humidifier. No snoring on BiPAP. The pressure is well tolerated. The mask is comfortable-nasal mask. No mask leak. No significant daytime sleepiness. No nodding off when driving. No dry nose or throat. No fatigue. Bedtime is 930-10 pm and rise time is 5 am. Sleep onset is <10 minutes. Wakes up 0 times at night total. No nocturia. No naps during the day. No headache in am. No weight gain. 1 caffienated beverages during the day. Occasional alcohol. ESS is 2. Past Medical History:   Diagnosis Date    GERD (gastroesophageal reflux disease)     Hypertension, essential 4/20/2017    Sleep apnea     USES BI-PAP     Past Surgical History:   Procedure Laterality Date    SLEEVE GASTRECTOMY  11/13/2017    LAPAROSCOPIC SLEEVE GASTRECTOMY -ETHICON, TRANS-ABDOMINAL PLANE BLOCK    UPPER GASTROINTESTINAL ENDOSCOPY  07/28/2017     No Known Allergies    No current outpatient medications on file. No current facility-administered medications for this visit. Objective:   PHYSICAL EXAM:    There were no vitals taken for this visit.' on RA  Room air  Exam:  Gen: No acute distress, does not appear to be in pain. Appears well developed and nourished. HENT: Head is normocephalic and atraumatic. Normal appearing nose. External Ears normal.   Neck: No visualized mass. Trachea is midline   Eyes: EOM intact. No visible discharge. Resp:No visualized signs of difficulty breathing or respiratory distress, speaking in full sentences. Respiratory effort normal.  Neuro: Awake. Alert. Able to follow commands. No facial asymmetry. Psych: Oriented x 3. No anxiety. Normal affect.          DATA:   Split night 2/6/2015 AHI 26.4 CPAP 14 cmH2O   Bipap titration 8/2016 improved on bipap pressures    BIPAP compliance data:  Compliance download report from 12/24/16 to 1/22/17  showed patient is using machine 5:26 hrs/night with 70% compliance and AHI 0.7 within this time frame. 21/30days with greater than 4 hours of machine use. 90% pressure 14.3/10.2 cm H20    Compliance download report from 12/27/17 to 1/25/17 showed patient is using machine 6:23 hrs/night with 76.7% compliance and AHI 1.3 within this time frame. 23/30days with greater than 4 hours of machine use. 90% pressure 14.3/10.4 cm H20    Compliance download report from 3/23/19 to 4/21/19 showed patient is using machine 5:52 hrs/night with 16.7% compliance and AHI 1.5 within this time frame. 4/30days with greater than 4 hours of machine use. 90% pressure 15/11.3 cm H20      Compliance download report from 7/27/19 to 8/25/19 reviewed today by me and showed patient is using machine 6:51 hrs/night with 96.7% compliance and AHI 3.1 within this time frame. 29/30days with greater than 4 hours of machine use. 90% pressure 13.8/10 cm H20     Compliance download report from 8/14/20 to 9/14/20 reviewed today by me and showed patient is using machine 6:53 hrs/night with 90% compliance and AHI 3.1 within this time frame. 27/30days with greater than 4 hours of machine use. 90% pressure 13.9/10.1 cm H20 on auto BIPAP EPAP minimum 8, IPAP maximum 20, pressure support 3-5 cm H2O      Assessment:       · Moderate DEIDRA. Failed CPAP 16 cmH2O. Nasal mask. Auto bipap Min EPAP 8 IPAP max 20 and PS 3-5. Optimal compliance and efficacy on review today. · Snoring- resolved on BIPAP   · Witnessed apnea-resolved on BIPAP  · Obesity         Plan:      · Continue auto bipap Min EPAP 8 IPAP max 20 and PS 3-5  · Order for BIPAP supplies  · Advised to use BiPAP 6-8 hrs at night  · Recommend at least 7 preferably 8 hours of sleep nightly with BIPAP  · Replacement of mask, tubing, head straps every 3-6 months or sooner if damaged. · Patient instructed to contact DME company for any mask, tubing or machine trouble shooting if problems arise. · Sleep hygiene  · Avoid sedatives, alcohol and caffeinated drinks at bed time. · No driving motorized vehicles or operating heavy machinery while fatigue, drowsy or sleepy. · Weight loss is also recommended as a long-term intervention. Patient down about 60 lbs since last year- had bariatric weight loss surgery. Congratulated and encouraged to continue  · Complications of DEIDRA if not treated were discussed with patient patient to include systemic hypertension, pulmonary hypertension, cardiovascular morbidities, car accidents and all cause mortality. · Patient education regarding sleep tips and PAP cleaning recommendations     Follow up 1 year, sooner if needed    Consent for telehealth visit was obtained and is noted in chart      C/ Eras 47. Earl Martines is a 28 y.o. male being evaluated by a Virtual Visit (video visit) encounter to address concerns as mentioned above. A caregiver was present when appropriate. Due to this being a TeleHealth encounter (During [facility]-99 public health emergency), evaluation of the following organ systems was limited: Vitals/Constitutional/EENT/Resp/CV/GI//MS/Neuro/Skin/Heme-Lymph-Imm. Pursuant to the emergency declaration under the [facility] [address] authority and the [employer] and [employer] Act, this Virtual Visit was conducted with patient's (and/or legal guardian's) consent, to reduce the patient's risk of exposure to COVID-19 and provide necessary medical care. The patient (and/or legal guardian) has also been advised to contact this office for worsening conditions or problems, and seek emergency medical treatment and/or call 911 if deemed necessary.      Patient identification was verified at the start of the visit: Yes    Services were provided through a video synchronous discussion virtually to substitute for in-person clinic visit. Patient and provider were located at their individual homes. --MARIETTA Stovall CNP on 10/5/2020 at 3:35 PM    An electronic signature was used to authenticate this note.

## 2020-10-05 NOTE — PATIENT INSTRUCTIONS
CPAP Equipment Cleaning and Disinfecting Schedule  Equipment Cleaning Frequency Instructions  Disinfecting Frequency   Non-Disposable Filters  Weekly Mild soapy water, Rinse, Air Dry Not Required   Disposable Filters Change as needed  2-4 weeks Do Not Wash Not Required   Hose/tubing Daily Mild soapy water, Rinse, Air Dry Once a week   Mask / Nasal Pillows Daily Mild soapy water, Rinse, Air Dry Once a week   Headgear Weekly Hand wash, Mild soapy water, Rinse, Dry  Not Required   Humidifier Daily Empty water daily  Mild soapy water, Rinse well, Air Dry  Once a week   CPAP Unit As Needed Dust with damp cloth,  No detergents or sprays Not Required         Disinfect (per schedule) with 1 part white vinegar and 3 parts water- soak mask and water chamber for 30 minutes every 1-2 weeks, more often if sick. Allow water/vinegar mixture to run through tubing. Allow all equipment to air dry. Drying Hints:   Always hang tubing away from direct sunlight, as this will cause the tubing to become yellow, brittle and crack over a period of time. DO NOT attach the wet tubing to your CPAP unit to blow-dry it. The moisture from the tubing can drain back into your machine. Moisture in your unit can cause sudden pressure increases or short circuits  DO's and DON'Ts:  - Don't use alcohol-based products to clean your mask, because it can cause the materials to become hard and brittle. - Don't put headgear in the washer or dryer  - Don't use any caustic or household cleaning solutions such as bleach on your CPAP   equipment.  - Do follow the recommended cleaning schedule. - Do change your disposable filter frequently. Adapted From: [employer]/cleaning. shtm. These are general suggestions for all models please follow specific s recommendations and specific instructions        Here are some tips to to getting better sleep  1- Avoid napping during the day: This will ensure you are tired at bedtime.  If you have to take a nap, sleep less than one hour, before 3 pm.   2- Exercise regularly, but not right before bed: but the timing of the workout is important. Exercising in the morning or early afternoon will not interfere with sleep. Exercising within two hours before bedtime can decrease your ability to fall asleep. Regular exercise is recommended to help you deepen the sleep. 3- Avoid heavy, spicy, or sugary foods 4-6 hours before bedtime: These can affect your ability to stay asleep. 4- Have a light snack before bed: Having an empty stomach can interfere with your sleep. Dairy products and turkey contain tryptophan, which acts as a natural sleep inducer. 5- Stay away from caffeine, nicotine and alcohol at least 4-6 hours before bed: Caffeine and nicotine are stimulants that interfere with your ability to fall asleep. While alcohol has an immediate sleep-inducing effect, a few hours later, as alcohol levels in your blood start to fall, there is a stimulant effect and you will experience fragmented sleep. 6- Take a hot bath 90 minutes before bedtime:  A hot bath will raise your body temperature, but it is the drop in body temperature that may leave you feeling sleepy  7- Develop sleep rituals: it is important to give your body cues that it is time to slow down and sleep. Listen to relaxing music, read something soothing for 15 minutes, have a cup of caffeine free tea, or do relaxation exercises such as yoga or deep breathing help relieve anxiety and reduce muscle tension. 8- Fix a bedtime and an awakening time: Even on weekends! When your sleep cycle has a regular rhythm, you will feel better. 9- Sleep only when sleepy: This reduces the time you are awake in bed. 10- Get into your favorite sleeping position: If you can't fall asleep within 15-30 minutes, get up and do something boring until you feel sleepy. Sit quietly in the dark or read the warranty on your refrigerator.  Don't expose yourself to bright light while you are up, it gives cues to your brain that it is time to wake up. 11- Only use your bed for sleeping: Dont use the bed as an office, workroom or recreation room. Let your body \"know\" that the bed is associated with sleeping  12- Use comfortable bedding. Uncomfortable bedding can prevent good sleep. Evaluate whether or not this is a source of your problem, and make appropriate changes. 13- Make sure your bed and bedroom are quiet and comfortable: A hot room can be uncomfortable. A cooler room, along with enough blankets to stay warm is recommended. Get a blackout shade or wear a slumber mask and wear earplugs or get a \"white noise\" machine for light and noise distractions. 14- Use sunlight to set your biological clock: When you get up in the morning, go outside and turn your face to the sun for 15 minutes. 13- Dont take your worries to bed: Leave worries about job, school, daily life, etc., behind when you go to bed. Some people find it useful to assign a \"worry period\" during the evening or afternoon for these issues.

## 2021-04-09 ENCOUNTER — TELEPHONE (OUTPATIENT)
Dept: BARIATRICS/WEIGHT MGMT | Age: 36
End: 2021-04-09

## 2021-10-08 ENCOUNTER — TELEPHONE (OUTPATIENT)
Dept: PULMONOLOGY | Age: 36
End: 2021-10-08

## 2021-10-08 ENCOUNTER — VIRTUAL VISIT (OUTPATIENT)
Dept: PULMONOLOGY | Age: 36
End: 2021-10-08
Payer: COMMERCIAL

## 2021-10-08 DIAGNOSIS — E66.01 MORBID OBESITY WITH BMI OF 40.0-44.9, ADULT (HCC): ICD-10-CM

## 2021-10-08 DIAGNOSIS — Z71.89 ENCOUNTER FOR BIPAP USE COUNSELING: ICD-10-CM

## 2021-10-08 DIAGNOSIS — G47.33 MODERATE OBSTRUCTIVE SLEEP APNEA: Primary | ICD-10-CM

## 2021-10-08 PROCEDURE — G8427 DOCREV CUR MEDS BY ELIG CLIN: HCPCS | Performed by: NURSE PRACTITIONER

## 2021-10-08 PROCEDURE — 99213 OFFICE O/P EST LOW 20 MIN: CPT | Performed by: NURSE PRACTITIONER

## 2021-10-08 ASSESSMENT — SLEEP AND FATIGUE QUESTIONNAIRES
HOW LIKELY ARE YOU TO NOD OFF OR FALL ASLEEP WHILE LYING DOWN TO REST IN THE AFTERNOON WHEN CIRCUMSTANCES PERMIT: 1
HOW LIKELY ARE YOU TO NOD OFF OR FALL ASLEEP WHILE SITTING AND READING: 1
HOW LIKELY ARE YOU TO NOD OFF OR FALL ASLEEP WHILE SITTING INACTIVE IN A PUBLIC PLACE: 0
ESS TOTAL SCORE: 3
HOW LIKELY ARE YOU TO NOD OFF OR FALL ASLEEP WHILE SITTING AND TALKING TO SOMEONE: 0
HOW LIKELY ARE YOU TO NOD OFF OR FALL ASLEEP WHILE WATCHING TV: 0
HOW LIKELY ARE YOU TO NOD OFF OR FALL ASLEEP IN A CAR, WHILE STOPPED FOR A FEW MINUTES IN TRAFFIC: 0
HOW LIKELY ARE YOU TO NOD OFF OR FALL ASLEEP WHILE SITTING QUIETLY AFTER LUNCH WITHOUT ALCOHOL: 0
HOW LIKELY ARE YOU TO NOD OFF OR FALL ASLEEP WHEN YOU ARE A PASSENGER IN A CAR FOR AN HOUR WITHOUT A BREAK: 1

## 2021-10-08 NOTE — TELEPHONE ENCOUNTER
Grace Gray is ordering new BIPAP for patient. Need to know where patient wants orders sent. (per chart uses [facility] but they told me they wont have any machine until Dec) I believe [facility] has BIPAP machines. Patient also needs 31-90d and recall #  [phone]    Patient called with message left for patient to call back to office.

## 2021-10-08 NOTE — PROGRESS NOTES
effort normal.  Neuro: Awake. Alert. Able to follow commands. No facial asymmetry. Psych: Oriented x 3. No anxiety. Normal affect. DATA:   Split night 2/6/2015 AHI 26.4 CPAP 14 cmH2O   Bipap titration 8/2016 improved on bipap pressures    BIPAP compliance data:  Compliance download report from 12/24/16 to 1/22/17  showed patient is using machine 5:26 hrs/night with 70% compliance and AHI 0.7 within this time frame. 21/30days with greater than 4 hours of machine use. 90% pressure 14.3/10.2 cm H20    Compliance download report from 12/27/17 to 1/25/17 showed patient is using machine 6:23 hrs/night with 76.7% compliance and AHI 1.3 within this time frame. 23/30days with greater than 4 hours of machine use. 90% pressure 14.3/10.4 cm H20    Compliance download report from 3/23/19 to 4/21/19 showed patient is using machine 5:52 hrs/night with 16.7% compliance and AHI 1.5 within this time frame. 4/30days with greater than 4 hours of machine use. 90% pressure 15/11.3 cm H20      Compliance download report from 7/27/19 to 8/25/19 reviewed today by me and showed patient is using machine 6:51 hrs/night with 96.7% compliance and AHI 3.1 within this time frame. 29/30days with greater than 4 hours of machine use. 90% pressure 13.8/10 cm H20     Compliance download report from 8/14/20 to 9/14/20 reviewed today by me and showed patient is using machine 6:53 hrs/night with 90% compliance and AHI 3.1 within this time frame. 27/30days with greater than 4 hours of machine use. 90% pressure 13.9/10.1 cm H20 on auto BIPAP EPAP minimum 8, IPAP maximum 20, pressure support 3-5 cm H2O    Compliance download report from 8/29/21 to 9/27/21 reviewed today by me and showed patient is using machine 6:21 hrs/night with 80% compliance and AHI 1.0 within this time frame. 24/30days with greater than 4 hours of machine use. 90% pressure 13.9/9.7 cm H20 on auto BIPAP 8-20, PS 3-5 Cm H2O      Assessment:       · Moderate DEIDRA. Failed CPAP 16 cmH2O. Nasal mask. Auto bipap Min EPAP 8 IPAP max 20 and PS 3-5. Optimal compliance and efficacy on review today. · Snoring- resolved on BIPAP   · Witnessed apnea-resolved on BIPAP  · Obesity         Plan:      · Order new auto bipap Min EPAP 8 IPAP max 20 and PS 3-5  · Order for BIPAP supplies  · Advised to use BiPAP 6-8 hrs at night  · Recommend at least 7 preferably 8 hours of sleep nightly with BIPAP  · Replacement of mask, tubing, head straps every 3-6 months or sooner if damaged. · Patient instructed to contact DME company for any mask, tubing or machine trouble shooting if problems arise. · Sleep hygiene  · Avoid sedatives, alcohol and caffeinated drinks at bed time. · No driving motorized vehicles or operating heavy machinery while fatigue, drowsy or sleepy. · Weight loss is also recommended as a long-term intervention. Patient down about 60 lbs since last year- had bariatric weight loss surgery. Congratulated and encouraged to continue  · Complications of DEIDRA if not treated were discussed with patient patient to include systemic hypertension, pulmonary hypertension, cardiovascular morbidities, car accidents and all cause mortality. · Patient education regarding sleep tips and PAP cleaning recommendations     -Discussed [employer] recently recalled some Pap units. Patient encouraged to call DME/ to see if his unit is on recall and register it if so. Discussed risks/benefits of untreated sleep apnea as well as risks/benefits of use of unit if recalled. At this time, if patients have moderate to severe sleep apnea or have severe daytime sleepiness, it is recommended continue therapy until the machine can be replaced. Based upon the information we have so far, it appears the risk of stopping therapy may be higher than the risks associated with foam degradation.   However, there are still many unknown variables and each patient will have to make a final determination on his or her own. Patient states he plans to continue current BiPAP until he can get a replacement. Please only use cleaning methods recommended by . Follow up 1 year, sooner if needed    Consent for telehealth visit was obtained and is noted in chart      C/ Eras 47. Grecia Campbell is a 39 y.o. male being evaluated by a Virtual Visit (video visit) encounter to address concerns as mentioned above. A caregiver was present when appropriate. Due to this being a TeleHealth encounter ([facility]), evaluation of the following organ systems was limited: Vitals/Constitutional/EENT/Resp/CV/GI//MS/Neuro/Skin/Heme-Lymph-Imm. Pursuant to the emergency declaration under the [address] [place] [address] authority and the [employer] and [employer] Act, this Virtual Visit was conducted with patient's (and/or legal guardian's) consent, to reduce the patient's risk of exposure to COVID-19 and provide necessary medical care. The patient (and/or legal guardian) has also been advised to contact this office for worsening conditions or problems, and seek emergency medical treatment and/or call 911 if deemed necessary. Patient identification was verified at the start of the visit: Yes    Services were provided through a video synchronous discussion virtually to substitute for in-person clinic visit. Patient and provider were located at their individual homes. --MARIETTA Gagnon CNP on 10/8/2021 at 2:34 PM    An electronic signature was used to authenticate this note.

## 2021-10-08 NOTE — PATIENT INSTRUCTIONS

## 2021-10-08 NOTE — TELEPHONE ENCOUNTER
Within this Telehealth Consent, the terms you and yours refer to the person using the Telehealth Service (Service), or in the case of a use of the Service by or on behalf of a minor, you and yours refer to and include (i) the parent or legal guardian who provides consent to the use of the Service by such minor or uses the Service on behalf of such minor, and (ii) the minor for whom consent is being provided or on whose behalf the Service is being utilized. When using Service, you will be consulting with your health care providers via the use of Telehealth.   Telehealth involves the delivery of healthcare services using electronic communications, information technology or other means between a healthcare provider and a patient who are not in the same physical location. Telehealth may be used for diagnosis, treatment, follow-up and/or patient education, and may include, but is not limited to, one or more of the following:    Electronic transmission of medical records, photo images, personal health information or other data between a patient and a healthcare provider    Interactions between a patient and healthcare provider via audio, video and/or data communications    Use of output data from medical devices, sound and video files    Anticipated Benefits   The use of Telehealth by your Provider(s) through the Service may have the following possible benefits:    Making it easier and more efficient for you to access medical care and treatment for the conditions treated by such Provider(s) utilizing the Service    Allowing you to obtain medical care and treatment by Provider(s) at times that are convenient for you    Enabling you to interact with Provider(s) without the necessity of an in-office appointment     Possible Risks   While the use of Telehealth can provide potential benefits for you, there are also potential risks associated with the use of Telehealth.  These risks include, but may not be limited to the following:    Your Provider(s) may not able to provide medical treatment for your particular condition and you may be required to seek alternative healthcare or emergency care services.  The electronic systems or other security protocols or safeguards used in the Service could fail, causing a breach of privacy of your medical or other information.  Given regulatory requirements in certain jurisdictions, your Provider(s) diagnosis and/or treatment options, especially pertaining to certain prescriptions, may be limited. Acceptance   1. You understand that Services will be provided via Telehealth. This process involves the use of HIPAA compliant and secure, real-time audio-visual interfacing with a qualified and appropriately trained provider located at [facility]. 2. You understand that, under no circumstances, will this session be recorded. 3. You understand that the Provider(s) at [facility] and other clinical participants will be party to the information obtained during the Telehealth session in accordance with best medical practices. 4. You understand that the information obtained during the Telehealth session will be used to help determine the most appropriate treatment options. 5. You understand that You have the right to revoke this consent at any point in time. 6. You understand that Telehealth is voluntary, and that continued treatment is not dependent upon consent. 7. You understand that, in the event of non-consent to Telehealth services and/or technical difficulties, you will obtain services as typically provided in the absence of Telehealth technology. 8. You understand that this consent will be kept in Your medical record. 9. No potential benefits from the use of Telehealth or specific results can be guaranteed. Your condition may not be cured or improved and, in some cases, may get worse.    10. There are limitations in the provision of medical care and treatment via Telehealth and the Service and you may not be able to receive diagnosis and/or treatment through the Service for every condition for which you seek diagnosis and/or treatment. 11. There are potential risks to the use of Telehealth, including but not limited to the risks described in this Telehealth Consent. 12. Your Provider(s) have discussed the use of Telehealth and the Service with you, including the benefits and risks of such and you have provided oral consent to your Provider(s) for the use of Telehealth and the Service. 15. You understand that it is your duty to provide your Provider(s) truthful, accurate and complete information, including all relevant information regarding care that you may have received or may be receiving from other healthcare providers outside of the Service. 14. You understand that each of your Provider(s) may determine in his or sole discretion that your condition is not suitable for diagnosis and/or treatment using the Service, and that you may need to seek medical care and treatment a specialist or other healthcare provider, outside of the Service. 15. You understand that you are fully responsible for payment for all services provided by Provider(s) or through use of the Service and that you may not be able to use third-party insurance. 16. You represent that (a) you have read this Telehealth Consent carefully, (b) you understand the risks and benefits of the Service and the use of Telehealth in the medical care and treatment provided to you by Provider(s) using the Service, and (c) you have the legal capacity and authority to provide this consent for yourself and/or the minor for which you are consenting under applicable federal and state laws, including laws relating to the age of [de-identified] and/or parental/guardian consent.    17. You give your informed consent to the use of Telehealth by Provider(s) using the Service under

## 2021-10-20 NOTE — TELEPHONE ENCOUNTER
Patient called with message left for patient to call back to office.      I have called patient three times with no answer or return call please advise

## 2022-03-18 ENCOUNTER — HOSPITAL ENCOUNTER (OUTPATIENT)
Age: 37
Discharge: HOME | End: 2022-03-18
Payer: COMMERCIAL

## 2022-03-18 LAB
ALT SERPL-CCNC: 29 U/L (ref 10–40)
AST SERPL-CCNC: 25 U/L (ref 15–37)
HCT VFR BLD CALC: 41.4 % (ref 40.5–52.5)
HEMOGLOBIN: 13.8 G/DL (ref 13.5–17.5)
MCH RBC QN AUTO: 27.3 PG (ref 26–34)
MCHC RBC AUTO-ENTMCNC: 33.3 G/DL (ref 31–36)
MCV RBC AUTO: 81.9 FL (ref 80–100)
PDW BLD-RTO: 13.8 % (ref 12.4–15.4)
PLATELET # BLD: 289 K/UL (ref 135–450)
PMV BLD AUTO: 7.5 FL (ref 5–10.5)
RBC # BLD: 5.05 M/UL (ref 4.2–5.9)
WBC # BLD: 5.6 K/UL (ref 4–11)

## 2022-03-18 PROCEDURE — 84450 TRANSFERASE (AST) (SGOT): CPT

## 2022-03-18 PROCEDURE — 85027 COMPLETE CBC AUTOMATED: CPT

## 2022-03-18 PROCEDURE — 84460 ALANINE AMINO (ALT) (SGPT): CPT

## 2022-04-19 ENCOUNTER — HOSPITAL ENCOUNTER (OUTPATIENT)
Age: 37
Discharge: HOME | End: 2022-04-19
Payer: COMMERCIAL

## 2022-04-19 LAB
ALT SERPL-CCNC: 65 U/L (ref 10–40)
AST SERPL-CCNC: 44 U/L (ref 15–37)

## 2022-04-19 PROCEDURE — 84450 TRANSFERASE (AST) (SGOT): CPT

## 2022-04-19 PROCEDURE — 36415 COLL VENOUS BLD VENIPUNCTURE: CPT

## 2022-04-19 PROCEDURE — 84460 ALANINE AMINO (ALT) (SGPT): CPT

## 2022-05-18 ENCOUNTER — HOSPITAL ENCOUNTER (OUTPATIENT)
Age: 37
Discharge: HOME | End: 2022-05-18
Payer: COMMERCIAL

## 2022-05-18 LAB
ALT SERPL-CCNC: 42 U/L (ref 10–40)
AST SERPL-CCNC: 26 U/L (ref 15–37)

## 2022-05-18 PROCEDURE — 84460 ALANINE AMINO (ALT) (SGPT): CPT

## 2022-05-18 PROCEDURE — 36415 COLL VENOUS BLD VENIPUNCTURE: CPT

## 2022-05-18 PROCEDURE — 84450 TRANSFERASE (AST) (SGOT): CPT

## 2022-12-27 ENCOUNTER — HOSPITAL ENCOUNTER (EMERGENCY)
Age: 37
Discharge: ANOTHER HOSPITAL | End: 2022-12-27
Attending: EMERGENCY MEDICINE
Payer: COMMERCIAL

## 2022-12-27 ENCOUNTER — APPOINTMENT (OUTPATIENT)
Dept: GENERAL RADIOLOGY | Age: 37
End: 2022-12-27
Payer: COMMERCIAL

## 2022-12-27 ENCOUNTER — APPOINTMENT (OUTPATIENT)
Dept: CT IMAGING | Age: 37
End: 2022-12-27
Payer: COMMERCIAL

## 2022-12-27 ENCOUNTER — HOSPITAL ENCOUNTER (INPATIENT)
Age: 37
LOS: 4 days | Discharge: HOME | DRG: 445 | End: 2022-12-31
Attending: INTERNAL MEDICINE | Admitting: INTERNAL MEDICINE
Payer: COMMERCIAL

## 2022-12-27 VITALS
TEMPERATURE: 97.8 F | HEART RATE: 74 BPM | DIASTOLIC BLOOD PRESSURE: 96 MMHG | OXYGEN SATURATION: 95 % | RESPIRATION RATE: 18 BRPM | WEIGHT: 300 LBS | HEIGHT: 72 IN | SYSTOLIC BLOOD PRESSURE: 149 MMHG | BODY MASS INDEX: 40.63 KG/M2

## 2022-12-27 DIAGNOSIS — K81.0 ACUTE CHOLECYSTITIS: ICD-10-CM

## 2022-12-27 DIAGNOSIS — K81.9 CHOLECYSTITIS: Primary | ICD-10-CM

## 2022-12-27 DIAGNOSIS — R10.11 ABDOMINAL PAIN, RIGHT UPPER QUADRANT: Primary | ICD-10-CM

## 2022-12-27 DIAGNOSIS — K81.9 CHOLECYSTITIS: ICD-10-CM

## 2022-12-27 DIAGNOSIS — E87.20 LACTIC ACIDOSIS: ICD-10-CM

## 2022-12-27 LAB
A/G RATIO: 1.3 (ref 1.1–2.2)
ALBUMIN SERPL-MCNC: 4.1 G/DL (ref 3.4–5)
ALP BLD-CCNC: 78 U/L (ref 40–129)
ALT SERPL-CCNC: 44 U/L (ref 10–40)
ANION GAP SERPL CALCULATED.3IONS-SCNC: 16 MMOL/L (ref 3–16)
AST SERPL-CCNC: 28 U/L (ref 15–37)
BASOPHILS ABSOLUTE: 0.1 K/UL (ref 0–0.2)
BASOPHILS RELATIVE PERCENT: 0.8 %
BILIRUB SERPL-MCNC: 0.4 MG/DL (ref 0–1)
BUN BLDV-MCNC: 20 MG/DL (ref 7–20)
CALCIUM SERPL-MCNC: 9.4 MG/DL (ref 8.3–10.6)
CHLORIDE BLD-SCNC: 103 MMOL/L (ref 99–110)
CO2: 23 MMOL/L (ref 21–32)
CREAT SERPL-MCNC: 0.7 MG/DL (ref 0.9–1.3)
EKG ATRIAL RATE: 66 BPM
EKG DIAGNOSIS: NORMAL
EKG P AXIS: 55 DEGREES
EKG P-R INTERVAL: 202 MS
EKG Q-T INTERVAL: 440 MS
EKG QRS DURATION: 86 MS
EKG QTC CALCULATION (BAZETT): 461 MS
EKG R AXIS: 32 DEGREES
EKG T AXIS: 51 DEGREES
EKG VENTRICULAR RATE: 66 BPM
EOSINOPHILS ABSOLUTE: 0.3 K/UL (ref 0–0.6)
EOSINOPHILS RELATIVE PERCENT: 2.2 %
GFR SERPL CREATININE-BSD FRML MDRD: >60 ML/MIN/{1.73_M2}
GLUCOSE BLD-MCNC: 174 MG/DL (ref 70–99)
HCT VFR BLD CALC: 40 % (ref 40.5–52.5)
HEMOGLOBIN: 13.3 G/DL (ref 13.5–17.5)
LACTIC ACID, SEPSIS: 2.5 MMOL/L (ref 0.4–1.9)
LACTIC ACID, SEPSIS: 4.9 MMOL/L (ref 0.4–1.9)
LIPASE: 44 U/L (ref 13–60)
LYMPHOCYTES ABSOLUTE: 5 K/UL (ref 1–5.1)
LYMPHOCYTES RELATIVE PERCENT: 37.2 %
MAGNESIUM: 2 MG/DL (ref 1.8–2.4)
MCH RBC QN AUTO: 26.9 PG (ref 26–34)
MCHC RBC AUTO-ENTMCNC: 33.2 G/DL (ref 31–36)
MCV RBC AUTO: 81 FL (ref 80–100)
MONOCYTES ABSOLUTE: 1.2 K/UL (ref 0–1.3)
MONOCYTES RELATIVE PERCENT: 9.2 %
NEUTROPHILS ABSOLUTE: 6.7 K/UL (ref 1.7–7.7)
NEUTROPHILS RELATIVE PERCENT: 50.6 %
PDW BLD-RTO: 13.9 % (ref 12.4–15.4)
PLATELET # BLD: 386 K/UL (ref 135–450)
PMV BLD AUTO: 8.3 FL (ref 5–10.5)
POTASSIUM REFLEX MAGNESIUM: 3.3 MMOL/L (ref 3.5–5.1)
RBC # BLD: 4.94 M/UL (ref 4.2–5.9)
SODIUM BLD-SCNC: 142 MMOL/L (ref 136–145)
TOTAL PROTEIN: 7.3 G/DL (ref 6.4–8.2)
TROPONIN: <0.01 NG/ML
WBC # BLD: 13.3 K/UL (ref 4–11)

## 2022-12-27 PROCEDURE — 6360000004 HC RX CONTRAST MEDICATION: Performed by: EMERGENCY MEDICINE

## 2022-12-27 PROCEDURE — 2700000000 HC OXYGEN THERAPY PER DAY

## 2022-12-27 PROCEDURE — 93005 ELECTROCARDIOGRAM TRACING: CPT | Performed by: EMERGENCY MEDICINE

## 2022-12-27 PROCEDURE — 93010 ELECTROCARDIOGRAM REPORT: CPT | Performed by: INTERNAL MEDICINE

## 2022-12-27 PROCEDURE — 94761 N-INVAS EAR/PLS OXIMETRY MLT: CPT

## 2022-12-27 PROCEDURE — 2580000003 HC RX 258: Performed by: EMERGENCY MEDICINE

## 2022-12-27 PROCEDURE — 74177 CT ABD & PELVIS W/CONTRAST: CPT

## 2022-12-27 PROCEDURE — 2580000003 HC RX 258: Performed by: INTERNAL MEDICINE

## 2022-12-27 PROCEDURE — 96375 TX/PRO/DX INJ NEW DRUG ADDON: CPT

## 2022-12-27 PROCEDURE — 80053 COMPREHEN METABOLIC PANEL: CPT

## 2022-12-27 PROCEDURE — 6360000002 HC RX W HCPCS: Performed by: EMERGENCY MEDICINE

## 2022-12-27 PROCEDURE — 83605 ASSAY OF LACTIC ACID: CPT

## 2022-12-27 PROCEDURE — 96376 TX/PRO/DX INJ SAME DRUG ADON: CPT

## 2022-12-27 PROCEDURE — 2500000003 HC RX 250 WO HCPCS: Performed by: STUDENT IN AN ORGANIZED HEALTH CARE EDUCATION/TRAINING PROGRAM

## 2022-12-27 PROCEDURE — 1200000000 HC SEMI PRIVATE

## 2022-12-27 PROCEDURE — 96365 THER/PROPH/DIAG IV INF INIT: CPT

## 2022-12-27 PROCEDURE — 6370000000 HC RX 637 (ALT 250 FOR IP): Performed by: INTERNAL MEDICINE

## 2022-12-27 PROCEDURE — 84484 ASSAY OF TROPONIN QUANT: CPT

## 2022-12-27 PROCEDURE — 36415 COLL VENOUS BLD VENIPUNCTURE: CPT

## 2022-12-27 PROCEDURE — 99285 EMERGENCY DEPT VISIT HI MDM: CPT

## 2022-12-27 PROCEDURE — 83735 ASSAY OF MAGNESIUM: CPT

## 2022-12-27 PROCEDURE — 83690 ASSAY OF LIPASE: CPT

## 2022-12-27 PROCEDURE — 87040 BLOOD CULTURE FOR BACTERIA: CPT

## 2022-12-27 PROCEDURE — 71045 X-RAY EXAM CHEST 1 VIEW: CPT

## 2022-12-27 PROCEDURE — 6370000000 HC RX 637 (ALT 250 FOR IP): Performed by: STUDENT IN AN ORGANIZED HEALTH CARE EDUCATION/TRAINING PROGRAM

## 2022-12-27 PROCEDURE — 2580000003 HC RX 258: Performed by: STUDENT IN AN ORGANIZED HEALTH CARE EDUCATION/TRAINING PROGRAM

## 2022-12-27 PROCEDURE — 6360000002 HC RX W HCPCS: Performed by: INTERNAL MEDICINE

## 2022-12-27 PROCEDURE — 85025 COMPLETE CBC W/AUTO DIFF WBC: CPT

## 2022-12-27 PROCEDURE — 6370000000 HC RX 637 (ALT 250 FOR IP): Performed by: EMERGENCY MEDICINE

## 2022-12-27 RX ORDER — ACETAMINOPHEN 325 MG/1
650 TABLET ORAL EVERY 6 HOURS PRN
Status: DISCONTINUED | OUTPATIENT
Start: 2022-12-27 | End: 2022-12-31 | Stop reason: HOSPADM

## 2022-12-27 RX ORDER — POTASSIUM CHLORIDE 750 MG/1
20 TABLET, EXTENDED RELEASE ORAL ONCE
Status: COMPLETED | OUTPATIENT
Start: 2022-12-27 | End: 2022-12-27

## 2022-12-27 RX ORDER — 0.9 % SODIUM CHLORIDE 0.9 %
30 INTRAVENOUS SOLUTION INTRAVENOUS ONCE
Status: DISCONTINUED | OUTPATIENT
Start: 2022-12-27 | End: 2022-12-27

## 2022-12-27 RX ORDER — OXYCODONE HYDROCHLORIDE 5 MG/1
5 TABLET ORAL EVERY 4 HOURS PRN
Status: DISCONTINUED | OUTPATIENT
Start: 2022-12-27 | End: 2022-12-31 | Stop reason: HOSPADM

## 2022-12-27 RX ORDER — SODIUM CHLORIDE 9 MG/ML
INJECTION, SOLUTION INTRAVENOUS PRN
Status: DISCONTINUED | OUTPATIENT
Start: 2022-12-27 | End: 2022-12-31 | Stop reason: HOSPADM

## 2022-12-27 RX ORDER — SODIUM CHLORIDE 0.9 % (FLUSH) 0.9 %
5-40 SYRINGE (ML) INJECTION EVERY 12 HOURS SCHEDULED
Status: DISCONTINUED | OUTPATIENT
Start: 2022-12-27 | End: 2022-12-31 | Stop reason: HOSPADM

## 2022-12-27 RX ORDER — FAMOTIDINE 10 MG/ML
20 INJECTION, SOLUTION INTRAVENOUS ONCE
Status: COMPLETED | OUTPATIENT
Start: 2022-12-27 | End: 2022-12-27

## 2022-12-27 RX ORDER — SODIUM CHLORIDE 0.9 % (FLUSH) 0.9 %
5-40 SYRINGE (ML) INJECTION PRN
Status: DISCONTINUED | OUTPATIENT
Start: 2022-12-27 | End: 2022-12-31 | Stop reason: HOSPADM

## 2022-12-27 RX ORDER — KETOROLAC TROMETHAMINE 30 MG/ML
30 INJECTION, SOLUTION INTRAMUSCULAR; INTRAVENOUS ONCE
Status: COMPLETED | OUTPATIENT
Start: 2022-12-27 | End: 2022-12-27

## 2022-12-27 RX ORDER — SODIUM CHLORIDE 9 MG/ML
INJECTION, SOLUTION INTRAVENOUS CONTINUOUS
Status: ACTIVE | OUTPATIENT
Start: 2022-12-27 | End: 2022-12-28

## 2022-12-27 RX ORDER — SUCRALFATE 1 G/1
1 TABLET ORAL ONCE
Status: COMPLETED | OUTPATIENT
Start: 2022-12-27 | End: 2022-12-27

## 2022-12-27 RX ORDER — POLYETHYLENE GLYCOL 3350 17 G/17G
17 POWDER, FOR SOLUTION ORAL DAILY PRN
Status: DISCONTINUED | OUTPATIENT
Start: 2022-12-27 | End: 2022-12-31 | Stop reason: HOSPADM

## 2022-12-27 RX ORDER — ONDANSETRON 2 MG/ML
4 INJECTION INTRAMUSCULAR; INTRAVENOUS
Status: DISCONTINUED | OUTPATIENT
Start: 2022-12-27 | End: 2022-12-27 | Stop reason: HOSPADM

## 2022-12-27 RX ORDER — ONDANSETRON 2 MG/ML
4 INJECTION INTRAMUSCULAR; INTRAVENOUS EVERY 6 HOURS PRN
Status: DISCONTINUED | OUTPATIENT
Start: 2022-12-27 | End: 2022-12-31 | Stop reason: HOSPADM

## 2022-12-27 RX ORDER — OXYCODONE HYDROCHLORIDE AND ACETAMINOPHEN 5; 325 MG/1; MG/1
2 TABLET ORAL ONCE
Status: COMPLETED | OUTPATIENT
Start: 2022-12-27 | End: 2022-12-27

## 2022-12-27 RX ORDER — ONDANSETRON 4 MG/1
4 TABLET, ORALLY DISINTEGRATING ORAL EVERY 8 HOURS PRN
Status: DISCONTINUED | OUTPATIENT
Start: 2022-12-27 | End: 2022-12-31 | Stop reason: HOSPADM

## 2022-12-27 RX ORDER — OXYCODONE HYDROCHLORIDE 5 MG/1
10 TABLET ORAL EVERY 4 HOURS PRN
Status: DISCONTINUED | OUTPATIENT
Start: 2022-12-27 | End: 2022-12-31 | Stop reason: HOSPADM

## 2022-12-27 RX ORDER — 0.9 % SODIUM CHLORIDE 0.9 %
30 INTRAVENOUS SOLUTION INTRAVENOUS ONCE
Status: COMPLETED | OUTPATIENT
Start: 2022-12-27 | End: 2022-12-27

## 2022-12-27 RX ORDER — ACETAMINOPHEN 650 MG/1
650 SUPPOSITORY RECTAL EVERY 6 HOURS PRN
Status: DISCONTINUED | OUTPATIENT
Start: 2022-12-27 | End: 2022-12-31 | Stop reason: HOSPADM

## 2022-12-27 RX ADMIN — SODIUM CHLORIDE: 9 INJECTION, SOLUTION INTRAVENOUS at 15:13

## 2022-12-27 RX ADMIN — POTASSIUM CHLORIDE 20 MEQ: 750 TABLET, EXTENDED RELEASE ORAL at 06:02

## 2022-12-27 RX ADMIN — HYDROMORPHONE HYDROCHLORIDE 1 MG: 1 INJECTION, SOLUTION INTRAMUSCULAR; INTRAVENOUS; SUBCUTANEOUS at 20:17

## 2022-12-27 RX ADMIN — SODIUM CHLORIDE: 9 INJECTION, SOLUTION INTRAVENOUS at 23:51

## 2022-12-27 RX ADMIN — FAMOTIDINE 20 MG: 10 INJECTION, SOLUTION INTRAVENOUS at 08:41

## 2022-12-27 RX ADMIN — ONDANSETRON HYDROCHLORIDE 4 MG: 2 INJECTION, SOLUTION INTRAMUSCULAR; INTRAVENOUS at 05:26

## 2022-12-27 RX ADMIN — IOPAMIDOL 75 ML: 755 INJECTION, SOLUTION INTRAVENOUS at 06:26

## 2022-12-27 RX ADMIN — OXYCODONE HYDROCHLORIDE AND ACETAMINOPHEN 2 TABLET: 5; 325 TABLET ORAL at 07:31

## 2022-12-27 RX ADMIN — ONDANSETRON 4 MG: 2 INJECTION INTRAMUSCULAR; INTRAVENOUS at 20:15

## 2022-12-27 RX ADMIN — HYDROMORPHONE HYDROCHLORIDE 1 MG: 1 INJECTION, SOLUTION INTRAMUSCULAR; INTRAVENOUS; SUBCUTANEOUS at 05:26

## 2022-12-27 RX ADMIN — HYDROMORPHONE HYDROCHLORIDE 2 MG: 1 INJECTION, SOLUTION INTRAMUSCULAR; INTRAVENOUS; SUBCUTANEOUS at 15:11

## 2022-12-27 RX ADMIN — SODIUM CHLORIDE 2328 ML: 9 INJECTION, SOLUTION INTRAVENOUS at 07:33

## 2022-12-27 RX ADMIN — SUCRALFATE 1 G: 1 TABLET ORAL at 08:41

## 2022-12-27 RX ADMIN — PIPERACILLIN AND TAZOBACTAM 3375 MG: 3; .375 INJECTION, POWDER, LYOPHILIZED, FOR SOLUTION INTRAVENOUS at 16:39

## 2022-12-27 RX ADMIN — HYDROMORPHONE HYDROCHLORIDE 1 MG: 1 INJECTION, SOLUTION INTRAMUSCULAR; INTRAVENOUS; SUBCUTANEOUS at 06:24

## 2022-12-27 RX ADMIN — ALUMINUM HYDROXIDE, MAGNESIUM HYDROXIDE, AND SIMETHICONE: 200; 200; 20 SUSPENSION ORAL at 07:39

## 2022-12-27 RX ADMIN — PIPERACILLIN SODIUM AND TAZOBACTAM SODIUM 4500 MG: 4; .5 INJECTION, POWDER, LYOPHILIZED, FOR SOLUTION INTRAVENOUS at 07:34

## 2022-12-27 RX ADMIN — KETOROLAC TROMETHAMINE 30 MG: 30 INJECTION, SOLUTION INTRAMUSCULAR; INTRAVENOUS at 07:31

## 2022-12-27 RX ADMIN — PIPERACILLIN AND TAZOBACTAM 3375 MG: 3; .375 INJECTION, POWDER, LYOPHILIZED, FOR SOLUTION INTRAVENOUS at 23:44

## 2022-12-27 RX ADMIN — OXYCODONE HYDROCHLORIDE 5 MG: 5 TABLET ORAL at 23:42

## 2022-12-27 RX ADMIN — OXYCODONE HYDROCHLORIDE 10 MG: 5 TABLET ORAL at 16:31

## 2022-12-27 ASSESSMENT — PAIN DESCRIPTION - DESCRIPTORS
DESCRIPTORS: ACHING
DESCRIPTORS: BURNING
DESCRIPTORS: DULL
DESCRIPTORS: THROBBING;STABBING
DESCRIPTORS: ACHING;CRAMPING
DESCRIPTORS: ACHING
DESCRIPTORS: ACHING;CRAMPING
DESCRIPTORS: ACHING;CRAMPING
DESCRIPTORS: STABBING

## 2022-12-27 ASSESSMENT — PAIN SCALES - GENERAL
PAINLEVEL_OUTOF10: 3
PAINLEVEL_OUTOF10: 7
PAINLEVEL_OUTOF10: 6
PAINLEVEL_OUTOF10: 7
PAINLEVEL_OUTOF10: 10
PAINLEVEL_OUTOF10: 8
PAINLEVEL_OUTOF10: 3
PAINLEVEL_OUTOF10: 8
PAINLEVEL_OUTOF10: 9
PAINLEVEL_OUTOF10: 10
PAINLEVEL_OUTOF10: 4
PAINLEVEL_OUTOF10: 10
PAINLEVEL_OUTOF10: 6

## 2022-12-27 ASSESSMENT — PAIN DESCRIPTION - LOCATION
LOCATION: ABDOMEN

## 2022-12-27 ASSESSMENT — LIFESTYLE VARIABLES
HOW MANY STANDARD DRINKS CONTAINING ALCOHOL DO YOU HAVE ON A TYPICAL DAY: 3 OR 4
HOW OFTEN DO YOU HAVE A DRINK CONTAINING ALCOHOL: 2-4 TIMES A MONTH

## 2022-12-27 ASSESSMENT — PAIN DESCRIPTION - ORIENTATION
ORIENTATION: MID
ORIENTATION: MID
ORIENTATION: LOWER
ORIENTATION: MID;LOWER
ORIENTATION: RIGHT;UPPER

## 2022-12-27 ASSESSMENT — PAIN DESCRIPTION - FREQUENCY
FREQUENCY: CONTINUOUS
FREQUENCY: CONTINUOUS

## 2022-12-27 ASSESSMENT — PAIN DESCRIPTION - ONSET
ONSET: ON-GOING
ONSET: ON-GOING

## 2022-12-27 ASSESSMENT — PAIN DESCRIPTION - PAIN TYPE
TYPE: ACUTE PAIN
TYPE: ACUTE PAIN

## 2022-12-27 ASSESSMENT — PAIN - FUNCTIONAL ASSESSMENT
PAIN_FUNCTIONAL_ASSESSMENT: 0-10
PAIN_FUNCTIONAL_ASSESSMENT: PREVENTS OR INTERFERES WITH MANY ACTIVE NOT PASSIVE ACTIVITIES
PAIN_FUNCTIONAL_ASSESSMENT: PREVENTS OR INTERFERES SOME ACTIVE ACTIVITIES AND ADLS
PAIN_FUNCTIONAL_ASSESSMENT: PREVENTS OR INTERFERES SOME ACTIVE ACTIVITIES AND ADLS
PAIN_FUNCTIONAL_ASSESSMENT: 0-10
PAIN_FUNCTIONAL_ASSESSMENT: ACTIVITIES ARE NOT PREVENTED

## 2022-12-27 NOTE — ED NOTES
Report to [facility], RN at [facility]. Denies questions/concerns at this time.  Verbalized understanding of ETA for transport         Harry Mendoza RN  12/27/22 8151

## 2022-12-27 NOTE — PROGRESS NOTES
Pt admitted to Room 339. Pt a/o. Blood pressure elevated, pt c/o abd pain. Message sent to MD for orders. [place] to room. Assessment complete as charted. Call light in reach. Denies other needs.

## 2022-12-27 NOTE — CONSULTS
Consult placed    Who:edil  Date:12/27/2022,  Time:3:38 PM        Electronically signed by Rob Melendez on 12/27/2022 at 3:38 PM

## 2022-12-27 NOTE — ED NOTES
Report to [facility] for transport to [facility]. The patient is alert and oriented at this time. Personal belongings sent with patient.       Alpesh Brower RN  12/27/22 9176

## 2022-12-27 NOTE — ED PROVIDER NOTES
[facility]      CHIEF COMPLAINT  Chest Pain (C/O right sided chest pain, radiating to mid - RUQ yesterday)       HISTORY OF PRESENT ILLNESS  Semaj Ricks is a 40 y.o. male  who presents to the ED complaining of severe right upper quadrant abdominal pain. Is been especially bad over the past hour. Has come and gone in waves somewhat. It is sharp stabbing pain.  + nausea.  + diaphoresis but no known fevers. He was sitting when it first started. Is been ongoing for the past day or so but acutely worse this morning. No suspect food ingestion. He did have weight loss surgery several years ago but no other abdominal surgeries. He does still have his gallbladder. He feels short of breath due to the pain. Pain radiates up into the chest.    No other complaints, modifying factors or associated symptoms. I have reviewed the following from the nursing documentation.     Past Medical History:   Diagnosis Date    GERD (gastroesophageal reflux disease)     Hypertension, essential 4/20/2017    Sleep apnea     USES BI-PAP     Past Surgical History:   Procedure Laterality Date    SLEEVE GASTRECTOMY  11/13/2017    LAPAROSCOPIC SLEEVE GASTRECTOMY -ETHICON, TRANS-ABDOMINAL PLANE BLOCK    UPPER GASTROINTESTINAL ENDOSCOPY  07/28/2017     Family History   Problem Relation Age of Onset    COPD Mother     Arthritis Mother     High Blood Pressure Father     High Cholesterol Father     High Blood Pressure Paternal Uncle     High Cholesterol Paternal Uncle     High Cholesterol Paternal Grandmother     Clotting Disorder Paternal Grandmother     High Blood Pressure Paternal Grandfather     High Cholesterol Paternal Grandfather      Social History     Socioeconomic History    Marital status:      Spouse name: Not on file    Number of children: Not on file    Years of education: Not on file    Highest education level: Not on file   Occupational History    Not on file   Tobacco Use    Smoking status: Never Smokeless tobacco: Former   Vaping Use    Vaping Use: Never used   Substance and Sexual Activity    Alcohol use: Yes     Comment: occassional drinker    Drug use: No    Sexual activity: Yes     Partners: Female   Other Topics Concern    Not on file   Social History Narrative    Not on file     Social Determinants of Health     Financial Resource Strain: Not on file   Food Insecurity: Not on file   Transportation Needs: Not on file   Physical Activity: Not on file   Stress: Not on file   Social Connections: Not on file   Intimate Partner Violence: Not on file   Housing Stability: Not on file     Current Facility-Administered Medications   Medication Dose Route Frequency Provider Last Rate Last Admin    ondansetron (ZOFRAN) injection 4 mg  4 mg IntraVENous Q1H PRN Amber Clemons MD   4 mg at 12/27/22 0526    piperacillin-tazobactam (ZOSYN) 4,500 mg in sodium chloride 0.9 % 100 mL IVPB (mini-bag)  4,500 mg IntraVENous Once Amber Clemons MD        0.9 % sodium chloride bolus  30 mL/kg (Ideal) IntraVENous Once Amber Clemons MD        ketorolac (TORADOL) injection 30 mg  30 mg IntraVENous Once Amber Clemons MD        oxyCODONE-acetaminophen (PERCOCET) 5-325 MG per tablet 2 tablet  2 tablet Oral Once Amber Clemons MD         No current outpatient medications on file. No Known Allergies    REVIEW OF SYSTEMS  10 systems reviewed, pertinent positives per HPI otherwise noted to be negative. PHYSICAL EXAM  BP (!) 197/95   Pulse 76   Temp 97.5 °F (36.4 °C) (Oral)   Resp 28   Ht 6' (1.829 m)   Wt 300 lb (136.1 kg)   SpO2 97%   BMI 40.69 kg/m²    GENERAL APPEARANCE: Awake and alert. Cooperative. Early uncomfortable and in pain no mild distress. HENT: Normocephalic. Atraumatic. Mucous membranes are moist.  No drooling or stridor. NECK: Supple. EYES: PERRL. EOM's grossly intact. HEART/CHEST: RRR. No murmurs. LUNGS: Respirations mildly labored. CTAB. No wheezes rales or rhonchi good air exchange. ABDOMEN: Focal right upper quadrant abdominal tenderness. Soft. Non-distended. No masses. No organomegaly. No guarding or rebound. MUSCULOSKELETAL: No extremity edema. Compartments soft. No deformity. No tenderness in the extremities. All extremities neurovascularly intact. SKIN: Warm and dry. No acute rashes. NEUROLOGICAL: Alert and oriented. CN's 2-12 intact. No gross facial drooping. No gross focal deficits. Normal gait. PSYCHIATRIC: Normal mood and affect. LABS  I have reviewed all labs for this visit.    Results for orders placed or performed during the hospital encounter of 12/27/22   CBC with Auto Differential   Result Value Ref Range    WBC 13.3 (H) 4.0 - 11.0 K/uL    RBC 4.94 4.20 - 5.90 M/uL    Hemoglobin 13.3 (L) 13.5 - 17.5 g/dL    Hematocrit 40.0 (L) 40.5 - 52.5 %    MCV 81.0 80.0 - 100.0 fL    MCH 26.9 26.0 - 34.0 pg    MCHC 33.2 31.0 - 36.0 g/dL    RDW 13.9 12.4 - 15.4 %    Platelets 966 732 - 676 K/uL    MPV 8.3 5.0 - 10.5 fL    Neutrophils % 50.6 %    Lymphocytes % 37.2 %    Monocytes % 9.2 %    Eosinophils % 2.2 %    Basophils % 0.8 %    Neutrophils Absolute 6.7 1.7 - 7.7 K/uL    Lymphocytes Absolute 5.0 1.0 - 5.1 K/uL    Monocytes Absolute 1.2 0.0 - 1.3 K/uL    Eosinophils Absolute 0.3 0.0 - 0.6 K/uL    Basophils Absolute 0.1 0.0 - 0.2 K/uL   Comprehensive Metabolic Panel w/ Reflex to MG   Result Value Ref Range    Sodium 142 136 - 145 mmol/L    Potassium reflex Magnesium 3.3 (L) 3.5 - 5.1 mmol/L    Chloride 103 99 - 110 mmol/L    CO2 23 21 - 32 mmol/L    Anion Gap 16 3 - 16    Glucose 174 (H) 70 - 99 mg/dL    BUN 20 7 - 20 mg/dL    Creatinine 0.7 (L) 0.9 - 1.3 mg/dL    Est, Glom Filt Rate >60 >60    Calcium 9.4 8.3 - 10.6 mg/dL    Total Protein 7.3 6.4 - 8.2 g/dL    Albumin 4.1 3.4 - 5.0 g/dL    Albumin/Globulin Ratio 1.3 1.1 - 2.2    Total Bilirubin 0.4 0.0 - 1.0 mg/dL    Alkaline Phosphatase 78 40 - 129 U/L    ALT 44 (H) 10 - 40 U/L    AST 28 15 - 37 U/L   Troponin   Result Value Ref Range    Troponin <0.01 <0.01 ng/mL   Lipase   Result Value Ref Range    Lipase 44.0 13.0 - 60.0 U/L   Magnesium   Result Value Ref Range    Magnesium 2.00 1.80 - 2.40 mg/dL   EKG 12 Lead   Result Value Ref Range    Ventricular Rate 66 BPM    Atrial Rate 66 BPM    P-R Interval 202 ms    QRS Duration 86 ms    Q-T Interval 440 ms    QTc Calculation (Bazett) 461 ms    P Axis 55 degrees    R Axis 32 degrees    T Axis 51 degrees    Diagnosis       Normal sinus rhythmNonspecific T wave abnormalityProlonged QTAbnormal ECGWhen compared with ECG of 14-NOV-2017 12:49,Vent. rate has increased BY  26 BPMNonspecific T wave abnormality now evident in Lateral leadsQT has lengthened       ECG  The Ekg interpreted by me shows  normal sinus rhythm with a rate of 66  Axis is   Normal  QTc is  within an acceptable range  Intervals and Durations are unremarkable. ST Segments: nonspecific changes  No significant change from prior EKG dated 11/14/17    RADIOLOGY  CT ABDOMEN PELVIS W IV CONTRAST Additional Contrast? None    Result Date: 12/27/2022  EXAMINATION: CT OF THE ABDOMEN AND PELVIS WITH CONTRAST 12/27/2022 6:08 am TECHNIQUE: CT of the abdomen and pelvis was performed with the administration of intravenous contrast. Multiplanar reformatted images are provided for review. Automated exposure control, iterative reconstruction, and/or weight based adjustment of the mA/kV was utilized to reduce the radiation dose to as low as reasonably achievable. COMPARISON: None. HISTORY: ORDERING SYSTEM PROVIDED HISTORY: ruq abd pain TECHNOLOGIST PROVIDED HISTORY: Reason for exam:->ruq abd pain Additional Contrast?->None Decision Support Exception - unselect if not a suspected or confirmed emergency medical condition->Emergency Medical Condition (MA) Reason for Exam: chest pain FINDINGS: Lower Chest: Minimal dependent atelectasis is noted in the lower lobes. Visualized heart is unremarkable. Organs: Hepatic steatosis is noted.   The gallbladder is contracted and not well evaluated. There may be mild pericholecystic edema. The pancreas, spleen and adrenal glands are unremarkable. The kidneys enhance symmetrically. There is no hydronephrosis. GI/Bowel: There is fluid in the distal esophagus. The patient is status post sleeve gastrectomy. There is fluid in the stomach which is otherwise unremarkable. No dilated loops of small bowel are identified. Appendix is normal in caliber. There is diverticulosis of the sigmoid colon, lacking acute inflammatory changes. Mesenteric vasculature enhances in normal fashion. Pelvis: Urinary bladder and prostate gland are unremarkable. Bilateral fat containing inguinal hernias. Peritoneum/Retroperitoneum: There is no adenopathy or aneurysm. Bones/Soft Tissues: No acute osseous abnormality. Multilevel degenerative disease of the visualized spine. Bilateral gynecomastia. 1. Contracted gallbladder, with questionable pericholecystic edema. Additional correlation for cholecystitis recommended. 2. Hepatic steatosis. 3. Status post sleeve gastrectomy. Small amount of fluid is noted in the distal esophagus, reflux versus dysmotility. XR CHEST PORTABLE    Result Date: 12/27/2022  EXAMINATION: ONE XRAY VIEW OF THE CHEST 12/27/2022 5:54 am COMPARISON: None. HISTORY: ORDERING SYSTEM PROVIDED HISTORY: CP TECHNOLOGIST PROVIDED HISTORY: Reason for exam:->CP Reason for Exam: chest pain FINDINGS: The cardiac silhouette, mediastinal and hilar contours are normal.  The lungs are clear. There are no pleural effusions. No acute osseous abnormalities are identified. No acute cardiopulmonary disease. ED COURSE/MDM  Patient seen and evaluated. Old records reviewed. Labs and imaging reviewed and results discussed with patient. Patient presenting for evaluation of cute onset of right upper quadrant abdominal pain. It is quite severe causing him to be very diaphoretic appearing and pale.   Patient was given antiemetics as well as IV Dilaudid for symptom control. Second dose of Dilaudid was given as the first dose did not achieve good pain control and he remains in significant pain despite 2 doses now of Dilaudid. We will add Toradol as well as some oral Percocet to see if we can transition him to any oral medications. He does have a leukocytosis of 13.3. No fevers at all. However, CT scan showing a contracted gallbladder with pericholecystic fluid. Certainly there is concern for possible acute cholecystitis versus biliary colic as a cause. Just very slight elevation of his ALT at 44, otherwise no evidence of dilated common bile duct or biliary ducts, no elevation of his bilirubin, alkaline phosphatase or lipase to suggest an obstructive process. Slight hypokalemia which was repleted orally. No evidence of any cardiac cause. At this time, will attempt to achieve pain control with additional medications and in the meantime add a lactic, blood cultures and fluids as he does meet sepsis criteria. If his lactic is reassuring though and his pain is controlled, may consider outpatient management with oral antibiotics, pain medication and close surgical follow-up. However, if pain is uncontrolled, lactic is elevated or any further concerns arise, patient will likely require admission    7:21 AM: I discussed the history, physical, and treatment plan with Dr. Belkis Aragon. Semaj Ricks was signed out in stable condition. Please see Dr. Sea Goldstein note for further details, including diagnosis and disposition. I, Dr. Alanis Garcia MD, am the primary clinician of record. Is this patient to be included in the SEP-1 Core Measure? Yes   SEP-1 CORE MEASURE DATA      Sepsis Criteria   Severe Sepsis Criteria   Septic Shock Criteria     Must be confirmed or suspected to move forward with diagnosis of sepsis.     Must meet 2:    [] Temperature > 100.9 F (38.3 C)        or < 96.8 F (36 C)  [] HR > 90  [x] RR > 20  [x] WBC > 12 or < 4 or 10% bands      AND:      [x] Infection Confirmed or        Suspected. Must meet 1:    [] Lactate > 2       or   [] Signs of Organ Dysfunction:    - SBP < 90 or MAP < 65  - Altered mental status  - Creatinine > 2 or increased from      baseline  - Urine Output < 0.5 ml/kg/hr  - Bilirubin > 2  - INR > 1.5 (not anticoagulated)  - Platelets < 262,717  - Acute Respiratory Failure as     evidenced by new need for NIPPV     or mechanical ventilation      [] No criteria met for Severe Sepsis. Must meet 1:    [] Lactate > 4        or   [] SBP < 90 or MAP < 65 for at        least two readings in the first        hour after fluid bolus        administration      [] Vasopressors initiated (if hypotension persists after fluid resuscitation)        [] No criteria met for Septic Shock. Patient Vitals for the past 6 hrs:   BP Temp Pulse Resp SpO2 Height Weight Weight Method Percent Weight Change   12/27/22 0541 (!) 206/137 97.5 °F (36.4 °C) 67 16 96 % 6' (1.829 m) 300 lb (136.1 kg) Stated 0   12/27/22 0659 (!) 197/95 -- 76 28 97 % -- -- -- --      Recent Labs     12/27/22  0517   WBC 13.3*   CREATININE 0.7*   BILITOT 0.4            Time  sepsis  Identified: [id number]    Fluid Resuscitation Rational: at least 30mL/kg based on entered ideal body weight at time of triage      Repeat lactate level:  lactic pending    Reassessment Exam:   Not applicable. Patient does not have septic shock.      During the patient's ED course, the patient was given:  Medications   ondansetron (ZOFRAN) injection 4 mg (4 mg IntraVENous Given 12/27/22 0526)   piperacillin-tazobactam (ZOSYN) 4,500 mg in sodium chloride 0.9 % 100 mL IVPB (mini-bag) (has no administration in time range)   0.9 % sodium chloride bolus (has no administration in time range)   ketorolac (TORADOL) injection 30 mg (has no administration in time range)   oxyCODONE-acetaminophen (PERCOCET) 5-325 MG per tablet 2 tablet (has no administration in time range) HYDROmorphone (DILAUDID) injection 1 mg (1 mg IntraVENous Given 12/27/22 0624)   potassium chloride (KLOR-CON M) extended release tablet 20 mEq (20 mEq Oral Given 12/27/22 0602)   iopamidol (ISOVUE-370) 76 % injection 75 mL (75 mLs IntraVENous Given 12/27/22 0626)          FINAL Impression    1. Abdominal pain, right upper quadrant    2. Lactic acidosis    3. Cholecystitis        Blood pressure (!) 149/96, pulse 74, temperature 97.8 °F (36.6 °C), temperature source Oral, resp. rate 18, height 6' (1.829 m), weight 300 lb (136.1 kg), SpO2 95 %. Final disposition:  Sherita Meckel was transferred to [facility] as a direct admission in stable condition. DISCLAIMER: This chart was created using Dragon dictation software. Efforts were made by me to ensure accuracy, however some errors may be present due to limitations of this technology and occasionally words are not transcribed correctly.        Atilio Recio MD  12/27/22 567 [facility] Claudean Nicolas, MD  12/27/22 8968

## 2022-12-27 NOTE — PLAN OF CARE
Problem: Pain  Goal: Verbalizes/displays adequate comfort level or baseline comfort level  Outcome: Progressing  Flowsheets (Taken 12/27/2022 5941)  Verbalizes/displays adequate comfort level or baseline comfort level:   Encourage patient to monitor pain and request assistance   Assess pain using appropriate pain scale   Administer analgesics based on type and severity of pain and evaluate response   Implement non-pharmacological measures as appropriate and evaluate response   Notify Licensed Independent Practitioner if interventions unsuccessful or patient reports new pain     Problem: Safety - Adult  Goal: Free from fall injury  Outcome: Progressing

## 2022-12-28 ENCOUNTER — APPOINTMENT (OUTPATIENT)
Dept: ULTRASOUND IMAGING | Age: 37
DRG: 445 | End: 2022-12-28
Attending: INTERNAL MEDICINE
Payer: COMMERCIAL

## 2022-12-28 LAB
ALBUMIN SERPL-MCNC: 3.9 G/DL (ref 3.4–5)
ANION GAP SERPL CALCULATED.3IONS-SCNC: 10 MMOL/L (ref 3–16)
BUN BLDV-MCNC: 15 MG/DL (ref 7–20)
CALCIUM SERPL-MCNC: 8.8 MG/DL (ref 8.3–10.6)
CHLORIDE BLD-SCNC: 102 MMOL/L (ref 99–110)
CO2: 25 MMOL/L (ref 21–32)
CREAT SERPL-MCNC: 0.6 MG/DL (ref 0.9–1.3)
GFR SERPL CREATININE-BSD FRML MDRD: >60 ML/MIN/{1.73_M2}
GLUCOSE BLD-MCNC: 136 MG/DL (ref 70–99)
HCT VFR BLD CALC: 38.2 % (ref 40.5–52.5)
HEMOGLOBIN: 12.8 G/DL (ref 13.5–17.5)
MAGNESIUM: 1.8 MG/DL (ref 1.8–2.4)
MCH RBC QN AUTO: 27.9 PG (ref 26–34)
MCHC RBC AUTO-ENTMCNC: 33.5 G/DL (ref 31–36)
MCV RBC AUTO: 83.1 FL (ref 80–100)
PDW BLD-RTO: 14.1 % (ref 12.4–15.4)
PHOSPHORUS: 2.8 MG/DL (ref 2.5–4.9)
PLATELET # BLD: 283 K/UL (ref 135–450)
PMV BLD AUTO: 8.1 FL (ref 5–10.5)
POTASSIUM SERPL-SCNC: 4.1 MMOL/L (ref 3.5–5.1)
RBC # BLD: 4.6 M/UL (ref 4.2–5.9)
SODIUM BLD-SCNC: 137 MMOL/L (ref 136–145)
WBC # BLD: 15.9 K/UL (ref 4–11)

## 2022-12-28 PROCEDURE — 2700000000 HC OXYGEN THERAPY PER DAY

## 2022-12-28 PROCEDURE — 99222 1ST HOSP IP/OBS MODERATE 55: CPT | Performed by: SURGERY

## 2022-12-28 PROCEDURE — 85027 COMPLETE CBC AUTOMATED: CPT

## 2022-12-28 PROCEDURE — 76705 ECHO EXAM OF ABDOMEN: CPT

## 2022-12-28 PROCEDURE — 80069 RENAL FUNCTION PANEL: CPT

## 2022-12-28 PROCEDURE — 6370000000 HC RX 637 (ALT 250 FOR IP): Performed by: INTERNAL MEDICINE

## 2022-12-28 PROCEDURE — 6360000002 HC RX W HCPCS: Performed by: INTERNAL MEDICINE

## 2022-12-28 PROCEDURE — 1200000000 HC SEMI PRIVATE

## 2022-12-28 PROCEDURE — APPNB60 APP NON BILLABLE TIME 46-60 MINS: Performed by: CLINICAL NURSE SPECIALIST

## 2022-12-28 PROCEDURE — 94761 N-INVAS EAR/PLS OXIMETRY MLT: CPT

## 2022-12-28 PROCEDURE — 2580000003 HC RX 258: Performed by: INTERNAL MEDICINE

## 2022-12-28 PROCEDURE — 83735 ASSAY OF MAGNESIUM: CPT

## 2022-12-28 PROCEDURE — 36415 COLL VENOUS BLD VENIPUNCTURE: CPT

## 2022-12-28 RX ADMIN — OXYCODONE HYDROCHLORIDE 10 MG: 5 TABLET ORAL at 17:41

## 2022-12-28 RX ADMIN — HYDROMORPHONE HYDROCHLORIDE 2 MG: 1 INJECTION, SOLUTION INTRAMUSCULAR; INTRAVENOUS; SUBCUTANEOUS at 09:46

## 2022-12-28 RX ADMIN — PIPERACILLIN AND TAZOBACTAM 3375 MG: 3; .375 INJECTION, POWDER, LYOPHILIZED, FOR SOLUTION INTRAVENOUS at 15:48

## 2022-12-28 RX ADMIN — ONDANSETRON 4 MG: 2 INJECTION INTRAMUSCULAR; INTRAVENOUS at 05:04

## 2022-12-28 RX ADMIN — PIPERACILLIN AND TAZOBACTAM 3375 MG: 3; .375 INJECTION, POWDER, LYOPHILIZED, FOR SOLUTION INTRAVENOUS at 09:56

## 2022-12-28 RX ADMIN — SODIUM CHLORIDE: 9 INJECTION, SOLUTION INTRAVENOUS at 09:51

## 2022-12-28 RX ADMIN — PIPERACILLIN AND TAZOBACTAM 3375 MG: 3; .375 INJECTION, POWDER, LYOPHILIZED, FOR SOLUTION INTRAVENOUS at 23:44

## 2022-12-28 RX ADMIN — HYDROMORPHONE HYDROCHLORIDE 2 MG: 1 INJECTION, SOLUTION INTRAMUSCULAR; INTRAVENOUS; SUBCUTANEOUS at 21:33

## 2022-12-28 RX ADMIN — HYDROMORPHONE HYDROCHLORIDE 2 MG: 1 INJECTION, SOLUTION INTRAMUSCULAR; INTRAVENOUS; SUBCUTANEOUS at 15:03

## 2022-12-28 RX ADMIN — ONDANSETRON 4 MG: 2 INJECTION INTRAMUSCULAR; INTRAVENOUS at 21:33

## 2022-12-28 RX ADMIN — HYDROMORPHONE HYDROCHLORIDE 2 MG: 1 INJECTION, SOLUTION INTRAMUSCULAR; INTRAVENOUS; SUBCUTANEOUS at 05:04

## 2022-12-28 ASSESSMENT — PAIN DESCRIPTION - DESCRIPTORS
DESCRIPTORS: STABBING
DESCRIPTORS: ACHING;SHARP
DESCRIPTORS: STABBING

## 2022-12-28 ASSESSMENT — PAIN DESCRIPTION - ORIENTATION
ORIENTATION: MID
ORIENTATION: MID;UPPER
ORIENTATION: MID

## 2022-12-28 ASSESSMENT — PAIN DESCRIPTION - LOCATION
LOCATION: ABDOMEN

## 2022-12-28 ASSESSMENT — PAIN SCALES - GENERAL
PAINLEVEL_OUTOF10: 8
PAINLEVEL_OUTOF10: 8
PAINLEVEL_OUTOF10: 4
PAINLEVEL_OUTOF10: 0
PAINLEVEL_OUTOF10: 7
PAINLEVEL_OUTOF10: 8
PAINLEVEL_OUTOF10: 0
PAINLEVEL_OUTOF10: 7
PAINLEVEL_OUTOF10: 0
PAINLEVEL_OUTOF10: 8

## 2022-12-28 ASSESSMENT — PAIN - FUNCTIONAL ASSESSMENT
PAIN_FUNCTIONAL_ASSESSMENT: PREVENTS OR INTERFERES SOME ACTIVE ACTIVITIES AND ADLS
PAIN_FUNCTIONAL_ASSESSMENT: PREVENTS OR INTERFERES SOME ACTIVE ACTIVITIES AND ADLS

## 2022-12-28 NOTE — PROGRESS NOTES
Patient sitting up in bed, comfort measures being given. Pain medication given previously and popsicles and ice chips provided a short time ago. Patient seems to have emotional distress. Voiced child that is 6years old has leukemia and they are always going back and forth at [facility] for treatment and care. Spiritual consult being placed for possible patient need.

## 2022-12-28 NOTE — H&P
Hospital Medicine History & Physical      PCP: Jose Mata DO    Date of Admission: 12/27/2022    Date of Service: Pt seen/examined on 28 Dec and Admitted to Inpatient with expected LOS greater than two midnights due to medical therapy. Chief Complaint:  RUQ/Epigastric pain      History Of Present Illness:      40 y.o. male w/out prior GB hx who presented to [facility] with a 2-3 day hx of worsening, now severe epigastric/RUQ pain, worse w/ eating and hasn't had much PO since onset w/ associated N/V    The patient denies any fever/chills or other signs/sxs of systemic illness or identifiable aggravating/alleviating factors. Past Medical History:          Diagnosis Date    GERD (gastroesophageal reflux disease)     Hypertension, essential 04/20/2017    Sleep apnea     USES BI-PAP       Past Surgical History:          Procedure Laterality Date    SLEEVE GASTRECTOMY  11/13/2017    LAPAROSCOPIC SLEEVE GASTRECTOMY -ETHICON, TRANS-ABDOMINAL PLANE BLOCK    UPPER GASTROINTESTINAL ENDOSCOPY  07/28/2017       Medications Prior to Admission:      Prior to Admission medications    Not on File       Allergies:  Patient has no known allergies. Social History:      The patient currently lives independently w/ wife [facility]  17 years. TOBACCO:   reports that he has never smoked. He has quit using smokeless tobacco.  ETOH:   reports current alcohol use. Family History:      Reviewed in detail and negative for DM, CAD, Cancer, CVA. Positive as follows:        Problem Relation Age of Onset    COPD Mother     Arthritis Mother     High Blood Pressure Father     High Cholesterol Father     High Blood Pressure Paternal Uncle     High Cholesterol Paternal Uncle     High Cholesterol Paternal Grandmother     Clotting Disorder Paternal Grandmother     High Blood Pressure Paternal Grandfather     High Cholesterol Paternal Grandfather        REVIEW OF SYSTEMS:   Pertinent positives as noted in the HPI. All other systems reviewed and negative. PHYSICAL EXAM:    BP (!) 167/99   Pulse 90   Temp 97.7 °F (36.5 °C) (Oral)   Resp 16   Ht 6' (1.829 m)   Wt 300 lb (136.1 kg)   SpO2 91%   BMI 40.69 kg/m²     General appearance:  No apparent distress, appears stated age and cooperative. HEENT:  Normal cephalic, atraumatic without obvious deformity. Pupils equal, round, and reactive to light. Extra ocular muscles intact. Conjunctivae/corneas clear. Neck: Supple, with full range of motion. No jugular venous distention. Trachea midline. Respiratory:  Normal respiratory effort. Clear to auscultation, bilaterally without Rales/Wheezes/Rhonchi. Cardiovascular:  Regular rate and rhythm with normal S1/S2 without murmurs, rubs or gallops. Abdomen: Soft, non-tender, non-distended with normal bowel sounds. Musculoskeletal:  No clubbing, cyanosis or edema bilaterally. Full range of motion without deformity. Skin: Skin color, texture, turgor normal.  No rashes or lesions. Neurologic:  Neurovascularly intact without any focal sensory/motor deficits. Cranial nerves: II-XII intact, grossly non-focal.  Psychiatric:  Alert and oriented, thought content appropriate, normal insight  Capillary Refill: Brisk,< 3 seconds   Peripheral Pulses: +2 palpable, equal bilaterally       CXR:  I have reviewed the CXR with the following interpretation: No acute ASD  EKG:  I have reviewed the EKG with the following interpretation: NSR w/out acute ischemic changes. Labs:     Recent Labs     12/27/22 0517 12/28/22 0631   WBC 13.3* 15.9*   HGB 13.3* 12.8*   HCT 40.0* 38.2*    283     Recent Labs     12/27/22 0517 12/28/22 0631    137   K 3.3* 4.1    102   CO2 23 25   BUN 20 15   CREATININE 0.7* 0.6*   CALCIUM 9.4 8.8   PHOS  --  2.8     Recent Labs     12/27/22 0517   AST 28   ALT 44*   BILITOT 0.4   ALKPHOS 78     No results for input(s): INR in the last 72 hours.   Recent Labs     12/27/22 0517   TROPONINI <0.01 Urinalysis:    No results found for: Sonido Plants, BACTERIA, RBCUA, BLOODU, SPECGRAV, GLUCOSEU      Consults:    IP CONSULT TO GENERAL SURGERY  IP CONSULT TO SPIRITUAL SERVICES      ASSESSMENT:    Active Hospital Problems    Diagnosis Date Noted    Acute cholecystitis [K81.0] 12/27/2022     Priority: Medium    Cholecystitis [K81.9] 12/27/2022     Priority: Medium       PLAN:      Cholecystitis - acute bacterial.  Started on Vanco/Zosyn in ED 27 Dec - Zosyn continued. General Surgery consulted and appreciated. PO/IV Narcotic analgesia as ordered. HTN - w/out known CAD and no evidence of active signs/sxs of ischemia/failure. Currently controlled on home meds w/ vitals reviewed and documented as above. GERD - w/out active signs/sxs of dysphagia/odynophagia. No evidence of active PUD or hx of GI bleed. Controlled on home PPI - continue. Morbid Obesity -  With Body mass index is 40.69 kg/m². Complicating assessment and treatment. Placing patient at risk for multiple co-morbidities as well as early death and contributing to the patient's presentation. Counseled on weight loss. DEIDRA - likely 2nd to obesity. Controlled on home CPAP/BiPap - continued, w/ outpatient f/u as previously arranged. DVT Prophylaxis: IPC  Diet: Diet NPO Exceptions are: Ice Chips, Sips of Water with Meds, Popsicles  Code Status: Full Code      PT/OT Eval Status: not yet ordered. Dispo - Patient is likely to remain in-house at least until Thurs/Friday 29/30 Dec pending clinical course and subspecialty Thompson Flaherty MD    Thank you Jose Medina DO for the opportunity to be involved in this patient's care. If you have any questions or concerns please feel free to contact me at [phone].

## 2022-12-28 NOTE — PROGRESS NOTES
12/28/22 1007   Encounter Summary   Encounter Overview/Reason  Initial Encounter   Service Provided For: Patient   Referral/Consult From: Nurse   Support System Family members;Spouse   Last Encounter  12/28/22  (Initial visit. No present spiritual needs.)   Complexity of Encounter Low   Begin Time 0950   End Time  1007   Total Time Calculated 17 min   Encounter    Type Initial Screen/Assessment   Assessment/Intervention/Outcome   Assessment Calm   Intervention Active listening;Discussed illness injury and its impact   Outcome Coping;Engaged in conversation;Receptive   Plan and Referrals   Plan/Referrals No future visits requested    referral from RN in light of patient dealing with a sick child at home. He states child diagnosed with leukemia in November. Things are going well and he sounds hopeful. He is focused on getting his pain diagnosed and treated. Has spousal support. States no spiritual needs at this time. PRN follow-up.

## 2022-12-28 NOTE — PROGRESS NOTES
Pt desats to 83-84% on RA, pt uses BiPAP at baseline during sleep. Started pt back on 3L oxygen per NC, pt oxygen returned to 91-92%.

## 2022-12-28 NOTE — CARE COORDINATION
CASE MANAGEMENT INITIAL ASSESSMENT      Reviewed chart and completed assessment with patient:bedside  Family present: none  Explained Case Management role/services. Primary contact information:[facility] Decision Maker :   Primary Decision Maker: Rosalinda Funes - Spouse - [phone]          Can this person be reached and be able to respond quickly, such as within a few minutes or hours? Yes      Admit date/status:12/27/22  Diagnosis:cholecystitis   Is this a Readmission?:  No      Insurance:[facility]   Precert required for SNF: Yes       3 night stay required: No    Living arrangements, Adls, care needs, prior to admission:lives in ranch home with wife and 2 kids. IPTA and drives. Durable Medical Equipment at home:  Walker__Cane__RTS__ BSC__Shower Chair__  02__ HHN__ CPAP__  BiPap__  Hospital Bed__ W/C___ Other_____    Services in the home and/or outpatient, prior to admission:none    Current PCP:Shara                                Medications Prescription coverage? yes  Transportation needs: none     PT/OT recs:none    Hospital Exemption Notification (HEN):needed for SNF    Barriers to discharge:none    Plan/comments:spoke with patient. Reported  lives in ranch home with wife and 2 kids. IPTA and drives. Will be able to get to any follow up apptes. Denied any DCP needs.  Estefani Painter RN    ECOC on chart for MD signature

## 2022-12-28 NOTE — PROGRESS NOTES
4 Eyes Skin Assessment     The patient is being assess for  Change of Shift- New admission. I agree that 2 RN's have performed a thorough Head to Toe Skin Assessment on the patient. ALL assessment sites listed below have been assessed. Areas assessed by both nurses:   [x]   Head, Face, and Ears   [x]   Shoulders, Back, and Chest  [x]   Arms, Elbows, and Hands   [x]   Coccyx, Sacrum, and Ischum  [x]   Legs, Feet, and Heels        Does the Patient have Skin Breakdown? No         Mack Prevention initiated:  No   Wound Care Orders initiated:  No      [facility] nurse consulted for Pressure Injury (Stage 3,4, Unstageable, DTI, NWPT, and Complex wounds):  No      Nurse 1 eSignature: Electronically signed by Huan Angel RN on 12/28/22 at 5:17 AM EST    **SHARE this note so that the co-signing nurse is able to place an eSignature**    Nurse 2 eSignature: {Esignature:[id number]}    Unremarkable. No wounds or pressure ulcers noted.

## 2022-12-28 NOTE — PLAN OF CARE
Requested if patient needed to get up to go to the bathroom to ask for someone to be with him to prevent any falls.      Problem: Safety - Adult  Goal: Free from fall injury  12/27/2022 2315 by Tomy Early RN  Outcome: Progressing  12/27/2022 2037 by Dang Kaufman RN  Outcome: Progressing

## 2022-12-28 NOTE — CONSULTS
Consult Call Back    Who:Janes [address]  Date:12/28/2022,  Time:10:57 AM    Electronically signed by Jake Pascal on 12/28/22 at 10:57 AM EST

## 2022-12-28 NOTE — PLAN OF CARE
Problem: Pain  Goal: Verbalizes/displays adequate comfort level or baseline comfort level  12/28/2022 1614 by Cristal Mehta RN  Outcome: Progressing  12/28/2022 1613 by Cristal Mehta RN  Outcome: Progressing     Problem: Safety - Adult  Goal: Free from fall injury  Outcome: Progressing

## 2022-12-28 NOTE — PROGRESS NOTES
Shift assessment completed. Pt A&O, VSS. Pt c/o of 4/10 abdominal pain, tenderness is increased in R mid-upper quadrant, denies need for PRN medication at this time. Bowel sounds hypoactive in all four quadrants. Denies any further needs at this time. Pt discharge dependent on findings from ultrasound today. Bed locked and in lowest position. Call light & bedside table are within reach.

## 2022-12-28 NOTE — CONSULTS
Department of General Surgery Consult    PATIENT NAME: Faye Ferguson OF BIRTH: 1985    ADMISSION DATE: 12/27/2022  1:01 PM      TODAY'S DATE: 12/28/2022    Reason for Consult:  possible cholecystitis    Chief Complaint: RUQ abd pain    Requesting Physician:  internal medicine    HISTORY OF PRESENT ILLNESS:              The patient is a 40 y.o. male who presents with complaints of severe RUQ and epigastric abdominal pain that started around 0400 yesterday. He reports that the day prior he had has some mild pain, but it didn't become really bad until yesterday morning. He reports he had associated nausea and diaphoresis. He was seen at [facility] and subsequently admitted here. He reports that when he had sleeve gastrectomy in 2017 an US was completed that showed he had gallstones, but he hasn't had any issues until now.     Past Medical History:        Diagnosis Date    GERD (gastroesophageal reflux disease)     Hypertension, essential 04/20/2017    Sleep apnea     USES BI-PAP       Past Surgical History:        Procedure Laterality Date    SLEEVE GASTRECTOMY  11/13/2017    LAPAROSCOPIC SLEEVE GASTRECTOMY -ETHICON, TRANS-ABDOMINAL PLANE BLOCK    UPPER GASTROINTESTINAL ENDOSCOPY  07/28/2017       Current Medications:   Current Facility-Administered Medications: oxyCODONE (ROXICODONE) immediate release tablet 5 mg, 5 mg, Oral, Q4H PRN **OR** oxyCODONE (ROXICODONE) immediate release tablet 10 mg, 10 mg, Oral, Q4H PRN  HYDROmorphone (DILAUDID) injection 1 mg, 1 mg, IntraVENous, Q4H PRN **OR** HYDROmorphone (DILAUDID) injection 2 mg, 2 mg, IntraVENous, Q4H PRN  0.9 % sodium chloride infusion, , IntraVENous, Continuous  sodium chloride flush 0.9 % injection 5-40 mL, 5-40 mL, IntraVENous, 2 times per day  sodium chloride flush 0.9 % injection 5-40 mL, 5-40 mL, IntraVENous, PRN  0.9 % sodium chloride infusion, , IntraVENous, PRN  ondansetron (ZOFRAN-ODT) disintegrating tablet 4 mg, 4 mg, Oral, Q8H PRN **OR** ondansetron (ZOFRAN) injection 4 mg, 4 mg, IntraVENous, Q6H PRN  polyethylene glycol (GLYCOLAX) packet 17 g, 17 g, Oral, Daily PRN  acetaminophen (TYLENOL) tablet 650 mg, 650 mg, Oral, Q6H PRN **OR** acetaminophen (TYLENOL) suppository 650 mg, 650 mg, Rectal, Q6H PRN  piperacillin-tazobactam (ZOSYN) 3,375 mg in dextrose 5 % 50 mL IVPB extended infusion (mini-bag), 3,375 mg, IntraVENous, Q8H  Prior to Admission medications    Not on File        Allergies:  Patient has no known allergies. Social History:   TOBACCO:   reports that he has never smoked. He has quit using smokeless tobacco.  ETOH:   reports current alcohol use. DRUGS:   reports no history of drug use. Family History:        Problem Relation Age of Onset    COPD Mother     Arthritis Mother     High Blood Pressure Father     High Cholesterol Father     High Blood Pressure Paternal Uncle     High Cholesterol Paternal Uncle     High Cholesterol Paternal Grandmother     Clotting Disorder Paternal Grandmother     High Blood Pressure Paternal Grandfather     High Cholesterol Paternal Grandfather        REVIEW OF SYSTEMS:  CONSTITUTIONAL:  positive for  sweats, malaise, and anorexia  HEENT:  negative  RESPIRATORY:  negative  CARDIOVASCULAR:  negative  GASTROINTESTINAL:  negative except for nausea and abdominal pain  GENITOURINARY:  negative  HEMATOLOGIC/LYMPHATIC:  negative  NEUROLOGICAL:  Negative  * All other ROS reviewed and negative. PHYSICAL EXAM:  VITALS:  BP (!) 167/99   Pulse 90   Temp 97.7 °F (36.5 °C) (Oral)   Resp 16   Ht 6' (1.829 m)   Wt 300 lb (136.1 kg)   SpO2 91%   BMI 40.69 kg/m²   24HR INTAKE/OUTPUT:    No intake/output data recorded. No intake/output data recorded.       CONSTITUTIONAL:  alert, no apparent distress and morbidly obese  EYES:  PERRL, sclera clear  ENT:  Normocephalic,atraumatic, without obvious abnormality  NECK:  supple, symmetrical, trachea midline  LUNGS: Resp effort easy and unlabored, no crackles or wheezing  CARDIOVASCULAR:  NO JVD, tachycardic with regular rhythm   ABDOMEN:   hypoactive bowel sounds, soft, non-distended, tenderness noted in the epigastric region and in the right upper quadrant Meraz's sign is present  MUSCULOSKELETAL: No clubbing or cyanosis, 0+ pitting edema lower extremities  NEUROLOGIC:  Mental Status Exam:  Level of Alertness:   awake  PSYCHIATRIC:   person, place, time  SKIN:  normal skin color, texture, turgor    DATA:    CBC:   Recent Labs     12/27/22  0517 12/28/22  0631   WBC 13.3* 15.9*   HGB 13.3* 12.8*   HCT 40.0* 38.2*    283     BMP:    Recent Labs     12/27/22  0517 12/28/22  0631    137   K 3.3* 4.1    102   CO2 23 25   BUN 20 15   CREATININE 0.7* 0.6*   GLUCOSE 174* 136*     Hepatic:   Recent Labs     12/27/22  0517   AST 28   ALT 44*   BILITOT 0.4   ALKPHOS 78     Mag:      Recent Labs     12/27/22 0517 12/28/22  0631   MG 2.00 1.80      Phos:     Recent Labs     12/28/22  0631   PHOS 2.8      INR: No results for input(s): INR in the last 72 hours. Radiology Review: Images personally reviewed by me. CT A/P  Impression   1. Contracted gallbladder, with questionable pericholecystic edema. Additional correlation for cholecystitis recommended. 2. Hepatic steatosis. 3. Status post sleeve gastrectomy. Small amount of fluid is noted in the   distal esophagus, reflux versus dysmotility. IMPRESSION/RECOMMENDATIONS:    History of known cholelithiasis with possible cholecystitis on CT, however pt's clinical picture does seem to correlate with gallbladder symptoms. US has been ordered and pending. Continue with IV antibiotics and GI rest. Pt will likely require cholecystectomy, however the timing of which will be dependant on his clinical course. Discussed at length with pt.      Electronically signed by Hawa Powell, [place] - [address] [facility] Surgery  [id number]

## 2022-12-29 LAB
ALBUMIN SERPL-MCNC: 3.6 G/DL (ref 3.4–5)
ALBUMIN SERPL-MCNC: 3.6 G/DL (ref 3.4–5)
ALP BLD-CCNC: 78 U/L (ref 40–129)
ALT SERPL-CCNC: 33 U/L (ref 10–40)
ANION GAP SERPL CALCULATED.3IONS-SCNC: 9 MMOL/L (ref 3–16)
AST SERPL-CCNC: 18 U/L (ref 15–37)
BILIRUB SERPL-MCNC: 1.4 MG/DL (ref 0–1)
BILIRUBIN DIRECT: 0.5 MG/DL (ref 0–0.3)
BILIRUBIN URINE: ABNORMAL
BILIRUBIN, INDIRECT: 0.9 MG/DL (ref 0–1)
BLOOD, URINE: NEGATIVE
BUN BLDV-MCNC: 15 MG/DL (ref 7–20)
CALCIUM SERPL-MCNC: 9.1 MG/DL (ref 8.3–10.6)
CHLORIDE BLD-SCNC: 99 MMOL/L (ref 99–110)
CLARITY: CLEAR
CO2: 27 MMOL/L (ref 21–32)
COLOR: ABNORMAL
CREAT SERPL-MCNC: 0.7 MG/DL (ref 0.9–1.3)
EPITHELIAL CELLS, UA: ABNORMAL /HPF (ref 0–5)
GFR SERPL CREATININE-BSD FRML MDRD: >60 ML/MIN/{1.73_M2}
GLUCOSE BLD-MCNC: 120 MG/DL (ref 70–99)
GLUCOSE BLD-MCNC: 147 MG/DL (ref 70–99)
GLUCOSE URINE: NEGATIVE MG/DL
HCT VFR BLD CALC: 36.7 % (ref 40.5–52.5)
HEMOGLOBIN: 12.3 G/DL (ref 13.5–17.5)
KETONES, URINE: ABNORMAL MG/DL
LEUKOCYTE ESTERASE, URINE: NEGATIVE
MCH RBC QN AUTO: 27.8 PG (ref 26–34)
MCHC RBC AUTO-ENTMCNC: 33.4 G/DL (ref 31–36)
MCV RBC AUTO: 83.1 FL (ref 80–100)
MICROSCOPIC EXAMINATION: YES
MUCUS: ABNORMAL /LPF
NITRITE, URINE: NEGATIVE
PDW BLD-RTO: 14.2 % (ref 12.4–15.4)
PERFORMED ON: ABNORMAL
PH UA: 6.5 (ref 5–8)
PHOSPHORUS: 2 MG/DL (ref 2.5–4.9)
PLATELET # BLD: 273 K/UL (ref 135–450)
PMV BLD AUTO: 7.8 FL (ref 5–10.5)
POTASSIUM SERPL-SCNC: 4.4 MMOL/L (ref 3.5–5.1)
PROTEIN UA: 30 MG/DL
RBC # BLD: 4.42 M/UL (ref 4.2–5.9)
RBC UA: ABNORMAL /HPF (ref 0–4)
SODIUM BLD-SCNC: 135 MMOL/L (ref 136–145)
SPECIFIC GRAVITY UA: 1.02 (ref 1–1.03)
TOTAL PROTEIN: 6.8 G/DL (ref 6.4–8.2)
URINE REFLEX TO CULTURE: ABNORMAL
URINE TYPE: ABNORMAL
UROBILINOGEN, URINE: 1 E.U./DL
WBC # BLD: 12.5 K/UL (ref 4–11)
WBC UA: ABNORMAL /HPF (ref 0–5)

## 2022-12-29 PROCEDURE — 2580000003 HC RX 258: Performed by: INTERNAL MEDICINE

## 2022-12-29 PROCEDURE — 85027 COMPLETE CBC AUTOMATED: CPT

## 2022-12-29 PROCEDURE — 81001 URINALYSIS AUTO W/SCOPE: CPT

## 2022-12-29 PROCEDURE — 80076 HEPATIC FUNCTION PANEL: CPT

## 2022-12-29 PROCEDURE — 99232 SBSQ HOSP IP/OBS MODERATE 35: CPT | Performed by: SURGERY

## 2022-12-29 PROCEDURE — 36415 COLL VENOUS BLD VENIPUNCTURE: CPT

## 2022-12-29 PROCEDURE — APPNB30 APP NON BILLABLE TIME 0-30 MINS: Performed by: CLINICAL NURSE SPECIALIST

## 2022-12-29 PROCEDURE — 6360000002 HC RX W HCPCS: Performed by: INTERNAL MEDICINE

## 2022-12-29 PROCEDURE — 1200000000 HC SEMI PRIVATE

## 2022-12-29 PROCEDURE — 6370000000 HC RX 637 (ALT 250 FOR IP): Performed by: INTERNAL MEDICINE

## 2022-12-29 PROCEDURE — 2700000000 HC OXYGEN THERAPY PER DAY

## 2022-12-29 PROCEDURE — 80069 RENAL FUNCTION PANEL: CPT

## 2022-12-29 PROCEDURE — 94761 N-INVAS EAR/PLS OXIMETRY MLT: CPT

## 2022-12-29 RX ORDER — PANTOPRAZOLE SODIUM 40 MG/1
40 TABLET, DELAYED RELEASE ORAL
Status: DISCONTINUED | OUTPATIENT
Start: 2022-12-29 | End: 2022-12-31 | Stop reason: HOSPADM

## 2022-12-29 RX ORDER — SODIUM CHLORIDE 9 MG/ML
INJECTION, SOLUTION INTRAVENOUS CONTINUOUS
Status: ACTIVE | OUTPATIENT
Start: 2022-12-29 | End: 2022-12-30

## 2022-12-29 RX ORDER — FAMOTIDINE 20 MG/1
20 TABLET, FILM COATED ORAL 2 TIMES DAILY
Status: DISCONTINUED | OUTPATIENT
Start: 2022-12-29 | End: 2022-12-31 | Stop reason: HOSPADM

## 2022-12-29 RX ADMIN — PIPERACILLIN AND TAZOBACTAM 3375 MG: 3; .375 INJECTION, POWDER, LYOPHILIZED, FOR SOLUTION INTRAVENOUS at 08:33

## 2022-12-29 RX ADMIN — PIPERACILLIN AND TAZOBACTAM 3375 MG: 3; .375 INJECTION, POWDER, LYOPHILIZED, FOR SOLUTION INTRAVENOUS at 16:21

## 2022-12-29 RX ADMIN — HYDROMORPHONE HYDROCHLORIDE 1 MG: 1 INJECTION, SOLUTION INTRAMUSCULAR; INTRAVENOUS; SUBCUTANEOUS at 20:49

## 2022-12-29 RX ADMIN — HYDROMORPHONE HYDROCHLORIDE 1 MG: 1 INJECTION, SOLUTION INTRAMUSCULAR; INTRAVENOUS; SUBCUTANEOUS at 08:29

## 2022-12-29 RX ADMIN — SODIUM CHLORIDE: 9 INJECTION, SOLUTION INTRAVENOUS at 10:01

## 2022-12-29 RX ADMIN — FAMOTIDINE 20 MG: 20 TABLET, FILM COATED ORAL at 20:51

## 2022-12-29 RX ADMIN — SODIUM CHLORIDE: 9 INJECTION, SOLUTION INTRAVENOUS at 18:13

## 2022-12-29 RX ADMIN — ONDANSETRON 4 MG: 2 INJECTION INTRAMUSCULAR; INTRAVENOUS at 20:49

## 2022-12-29 RX ADMIN — OXYCODONE HYDROCHLORIDE 5 MG: 5 TABLET ORAL at 01:13

## 2022-12-29 RX ADMIN — ONDANSETRON 4 MG: 2 INJECTION INTRAMUSCULAR; INTRAVENOUS at 12:57

## 2022-12-29 RX ADMIN — PANTOPRAZOLE SODIUM 40 MG: 40 TABLET, DELAYED RELEASE ORAL at 18:13

## 2022-12-29 RX ADMIN — HYDROMORPHONE HYDROCHLORIDE 2 MG: 1 INJECTION, SOLUTION INTRAMUSCULAR; INTRAVENOUS; SUBCUTANEOUS at 12:57

## 2022-12-29 ASSESSMENT — PAIN SCALES - GENERAL
PAINLEVEL_OUTOF10: 0
PAINLEVEL_OUTOF10: 5
PAINLEVEL_OUTOF10: 6
PAINLEVEL_OUTOF10: 7
PAINLEVEL_OUTOF10: 6

## 2022-12-29 ASSESSMENT — PAIN DESCRIPTION - DESCRIPTORS
DESCRIPTORS: ACHING
DESCRIPTORS: ACHING

## 2022-12-29 ASSESSMENT — PAIN DESCRIPTION - ORIENTATION: ORIENTATION: MID

## 2022-12-29 ASSESSMENT — PAIN DESCRIPTION - LOCATION
LOCATION: ABDOMEN
LOCATION: ABDOMEN

## 2022-12-29 NOTE — PROGRESS NOTES
Patient voiced having dark urine. His urine is dark lynn. No blood seem to be noted. Does look concentrated. Asked Luca Dany for a new order for UA. Order given, so the next time the patient urinates we will collect a sample. Secondly, taking with the patient about his bipap at home for his sleep apnea. He voiced he had it then there was a recall for the filer. Post the recall the patient voiced that some people received new orders and others didn't in order to get a new machine. He voiced he is still trying to work with the insurance in order to get his bipap replaced.

## 2022-12-29 NOTE — PROGRESS NOTES
Shift assessment completed. Pt A&O x4, VSS. Pt reporting pain 6/10 on the pain scale, PRN pain medication given per MAR. Pt reporting sweating and feeling \"weird\" after oxycodone was given last night. Non skid socks on. Bed locked and in lowest position. Call light and bedside table within reach. Will continue to monitor.

## 2022-12-29 NOTE — PROGRESS NOTES
Hospitalist Progress Note      PCP: Russ Peña DO    Date of Admission: 12/27/2022    Chief Complaint: RUQ/Epigastric pain    Subjective: no new c/o. Medications:  Reviewed    Infusion Medications    sodium chloride      sodium chloride       Scheduled Medications    sodium chloride flush  5-40 mL IntraVENous 2 times per day    piperacillin-tazobactam  3,375 mg IntraVENous Q8H     PRN Meds: oxyCODONE **OR** oxyCODONE, HYDROmorphone **OR** HYDROmorphone, sodium chloride flush, sodium chloride, ondansetron **OR** ondansetron, polyethylene glycol, acetaminophen **OR** acetaminophen      Intake/Output Summary (Last 24 hours) at 12/29/2022 0942  Last data filed at 12/29/2022 0119  Gross per 24 hour   Intake 2814.67 ml   Output 600 ml   Net 2214.67 ml       Physical Exam Performed:    BP (!) 146/85   Pulse 89   Temp 97.4 °F (36.3 °C) (Oral)   Resp 16   Ht 6' (1.829 m)   Wt 300 lb (136.1 kg)   SpO2 92%   BMI 40.69 kg/m²     General appearance: No apparent distress, appears stated age and cooperative. HEENT: Pupils equal, round, and reactive to light. Conjunctivae/corneas clear. Neck: Supple, with full range of motion. No jugular venous distention. Trachea midline. Respiratory:  Normal respiratory effort. Clear to auscultation, bilaterally without Rales/Wheezes/Rhonchi. Cardiovascular: Regular rate and rhythm with normal S1/S2 without murmurs, rubs or gallops. Abdomen: Soft, non-tender, non-distended with normal bowel sounds. Musculoskeletal: No clubbing, cyanosis or edema bilaterally. Full range of motion without deformity. Skin: Skin color, texture, turgor normal.  No rashes or lesions. Neurologic:  Neurovascularly intact without any focal sensory/motor deficits.  Cranial nerves: II-XII intact, grossly non-focal.  Psychiatric: Alert and oriented, thought content appropriate, normal insight  Capillary Refill: Brisk,< 3 seconds   Peripheral Pulses: +2 palpable, equal bilaterally Labs:   Recent Labs     12/27/22 0517 12/28/22 0631 12/29/22  0610   WBC 13.3* 15.9* 12.5*   HGB 13.3* 12.8* 12.3*   HCT 40.0* 38.2* 36.7*    283 273     Recent Labs     12/27/22  0517 12/28/22 0631 12/29/22  0610    137 135*   K 3.3* 4.1 4.4    102 99   CO2 23 25 27   BUN 20 15 15   CREATININE 0.7* 0.6* 0.7*   CALCIUM 9.4 8.8 9.1   PHOS  --  2.8 2.0*     Recent Labs     12/27/22 0517   AST 28   ALT 44*   BILITOT 0.4   ALKPHOS 78     No results for input(s): INR in the last 72 hours. Recent Labs     12/27/22 0517   TROPONINI <0.01       Urinalysis:    No results found for: Isadore Laos, BACTERIA, RBCUA, BLOODU, SPECGRAV, GLUCOSEU    Consults:    IP CONSULT TO GENERAL SURGERY  IP CONSULT TO SPIRITUAL SERVICES      Assessment/Plan:    Active Hospital Problems    Diagnosis     Acute cholecystitis [K81.0]      Priority: Medium    Cholecystitis [K81.9]      Priority: Medium       Cholecystitis - acute bacterial.  Started on Vanco/Zosyn in ED 27 Dec - Zosyn continued. General Surgery consulted and appreciated. PO/IV Narcotic analgesia as ordered. RUQ U/S 28 Dec non-diagnostic, likely 2nd to habitus     HTN - w/out known CAD and no evidence of active signs/sxs of ischemia/failure. Currently controlled on home meds w/ vitals reviewed and documented as above. GERD - w/out active signs/sxs of dysphagia/odynophagia. No evidence of active PUD or hx of GI bleed. Controlled on home PPI - continue. Morbid Obesity -  With Body mass index is 40.69 kg/m². Complicating assessment and treatment. Placing patient at risk for multiple co-morbidities as well as early death and contributing to the patient's presentation. Counseled on weight loss. DEIDRA - likely 2nd to obesity. Controlled on home CPAP/BiPap - continued, w/ outpatient f/u as previously arranged.           DVT Prophylaxis: IPC     Recent Labs     12/27/22 0517 12/28/22 0631 12/29/22  0610    283 273     Diet: Diet NPO Exceptions are: [employer], Sips of Water with Meds, Popsicles  Code Status: Full Code      PT/OT Eval Status: not yet ordered.       Dispo - Patient is likely to remain in-house at least until Friday/Sat 30/31 Dec pending clinical course and subspecialty Nessa Joe MD

## 2022-12-29 NOTE — PROGRESS NOTES
[facility] SURGERY    PATIENT NAME: Ruchi Vuong     TODAY'S DATE: 12/29/2022    CHIEF COMPLAINT: none    INTERVAL HISTORY/HPI:    Pt feels a bit better today, less upper abdominal pain vs yesterday, no N/V but no significant appetite yet. Still c/o dark urine     OBJECTIVE:  VITALS:  BP (!) 146/85   Pulse 89   Temp 97.4 °F (36.3 °C) (Oral)   Resp 16   Ht 6' (1.829 m)   Wt 300 lb (136.1 kg)   SpO2 92%   BMI 40.69 kg/m²     INTAKE/OUTPUT:    I/O last 3 completed shifts: In: 2844.7 [P.O.:60; I.V.:2684.7; IV Piggyback:99.9]  Out: 600 [Urine:600]  No intake/output data recorded. CONSTITUTIONAL:  awake and alert  LUNGS:     ABDOMEN:   , soft, obese, tenderness noted in the epigastric region and is mild, less sever than yesterday     Data:  CBC:   Recent Labs     12/27/22  0517 12/28/22 0631 12/29/22 0610   WBC 13.3* 15.9* 12.5*   HGB 13.3* 12.8* 12.3*   HCT 40.0* 38.2* 36.7*    283 273     BMP:    Recent Labs     12/27/22 0517 12/28/22 0631 12/29/22  0610    137 135*   K 3.3* 4.1 4.4    102 99   CO2 23 25 27   BUN 20 15 15   CREATININE 0.7* 0.6* 0.7*   GLUCOSE 174* 136* 120*     Hepatic:   Recent Labs     12/27/22  0517   AST 28   ALT 44*   BILITOT 0.4   ALKPHOS 78     Mag:      Recent Labs     12/27/22 0517 12/28/22 0631   MG 2.00 1.80      Phos:     Recent Labs     12/28/22 0631 12/29/22 0610   PHOS 2.8 2.0*      INR: No results for input(s): INR in the last 72 hours. Radiology Review:   Images reviewed by me  RIGHT UPPER QUADRANT ULTRASOUND       12/28/2022 10:19 am       COMPARISON:   12/27/2022       HISTORY:   ORDERING SYSTEM PROVIDED HISTORY: eval for gallstones, cholecystitis   TECHNOLOGIST PROVIDED HISTORY:       Reason for exam:->eval for gallstones, cholecystitis       FINDINGS:   Study markedly limited by patient's body habitus and overlying bowel gas. LIVER: Grossly increased echogenicity suggesting steatosis.   Limited   evaluation for focal lesions and intrahepatic biliary ductal dilatation. BILIARY SYSTEM:  Gallbladder and common bile duct are suboptimally visualized. Rhenda Vianca RIGHT KIDNEY: The right kidney is grossly unremarkable without evidence of   hydronephrosis. PANCREAS:  Suboptimally visualized. OTHER: No evidence of right upper quadrant ascites. Impression   Severely near-nondiagnostic limited examination. Right kidney appears within   normal limits.            ASSESSMENT AND PLAN:  Probable acute cholecystitis, slowly improving   - check LFTs today   - ok to start clear liquids, will advance as tolerated   - cont IV abx for now   - will follow    Electronically signed by Heather Ratliff MD

## 2022-12-29 NOTE — PLAN OF CARE
Problem: Pain  Goal: Verbalizes/displays adequate comfort level or baseline comfort level  Outcome: Progressing     Problem: Pain  Goal: Control of acute pain  Outcome: Progressing

## 2022-12-29 NOTE — CARE COORDINATION
Chart reviewed day 2. Patient with [address] completed yesterday. Bowel rest and IVATBX. IPTA denied any DCP  needs.  Xiao Roberts RN

## 2022-12-29 NOTE — PROGRESS NOTES
PS to [employer]: This patient does have dark urine. It looks concentrated and dark lynn in color.  Can we please have an order for a UA due to the darkness and as well patient request.

## 2022-12-29 NOTE — PROGRESS NOTES
Physician Progress Note      PATIENT:               Estefani Rodriguez  CSN #:                  [id number]  :                       1985  ADMIT DATE:       2022 1:01 PM  [address] [place] DATE:  RESPONDING  PROVIDER #:        Carmin Lesch MD          QUERY TEXT:    Pt admitted with  acute cholecystitis. Noted documentation of Sepsis by ED   physician at [place] [place]. SIRS on admit to [facility]:  WBC: 13.3,  HR: 81- 114. RR: 24-16, with LA: 4.9. If possible, please document in progress notes and   discharge summary:    The medical record reflects the following:  Risk Factors:  acute cholecystitis. Clinical Indicators: SIRS on admit to [facility]:  WBC: 13.3,  HR: 81- 114. RR:   24-16, with LA: 4.9. WBC trend: 13.3- 15.9- 12.5. Per OSH ED \"add a lactic,   blood cultures and fluids as he does meet sepsis criteria. Time  sepsis    Identified: 706. Fluid Resuscitation Rational: at least 30mL/kg based on   entered ideal body weight at time of triage\". Per H&P \" denies any   fever/chills or other signs/sxs of systemic illness\". Treatment: Zosyn, IVF @ 80, IVF bolus ED, Blood cx's (NTD), Surgical c/s  Options provided:  -- Sepsis due to acute cholecystitis confirmed present on admission  -- Acute cholecystitis only, no evidence of Sepsis  -- Other - I will add my own diagnosis  -- Disagree - Not applicable / Not valid  -- Disagree - Clinically unable to determine / Unknown  -- Refer to Clinical Documentation Reviewer    PROVIDER RESPONSE TEXT:    Acute cholecystitis only, no evidence of Sepsis. Query created by:  Ar Bai on 2022 9:55 AM      Electronically signed by:  Carmin Lesch MD 2022 10:04 AM

## 2022-12-30 ENCOUNTER — APPOINTMENT (OUTPATIENT)
Dept: NUCLEAR MEDICINE | Age: 37
DRG: 445 | End: 2022-12-30
Attending: INTERNAL MEDICINE
Payer: COMMERCIAL

## 2022-12-30 LAB
A/G RATIO: 1 (ref 1.1–2.2)
ALBUMIN SERPL-MCNC: 3.3 G/DL (ref 3.4–5)
ALP BLD-CCNC: 72 U/L (ref 40–129)
ALT SERPL-CCNC: 22 U/L (ref 10–40)
ANION GAP SERPL CALCULATED.3IONS-SCNC: 8 MMOL/L (ref 3–16)
AST SERPL-CCNC: 14 U/L (ref 15–37)
BILIRUB SERPL-MCNC: 1.1 MG/DL (ref 0–1)
BUN BLDV-MCNC: 20 MG/DL (ref 7–20)
CALCIUM SERPL-MCNC: 8.7 MG/DL (ref 8.3–10.6)
CHLORIDE BLD-SCNC: 102 MMOL/L (ref 99–110)
CO2: 26 MMOL/L (ref 21–32)
CREAT SERPL-MCNC: 0.7 MG/DL (ref 0.9–1.3)
GFR SERPL CREATININE-BSD FRML MDRD: >60 ML/MIN/{1.73_M2}
GLUCOSE BLD-MCNC: 126 MG/DL (ref 70–99)
HCT VFR BLD CALC: 40.2 % (ref 40.5–52.5)
HEMOGLOBIN: 13.5 G/DL (ref 13.5–17.5)
MCH RBC QN AUTO: 27.6 PG (ref 26–34)
MCHC RBC AUTO-ENTMCNC: 33.6 G/DL (ref 31–36)
MCV RBC AUTO: 82.3 FL (ref 80–100)
PDW BLD-RTO: 14.2 % (ref 12.4–15.4)
PHOSPHORUS: 3 MG/DL (ref 2.5–4.9)
PLATELET # BLD: 344 K/UL (ref 135–450)
PMV BLD AUTO: 8.1 FL (ref 5–10.5)
POTASSIUM SERPL-SCNC: 4.1 MMOL/L (ref 3.5–5.1)
RBC # BLD: 4.89 M/UL (ref 4.2–5.9)
SODIUM BLD-SCNC: 136 MMOL/L (ref 136–145)
TOTAL PROTEIN: 6.7 G/DL (ref 6.4–8.2)
WBC # BLD: 11.8 K/UL (ref 4–11)

## 2022-12-30 PROCEDURE — 6360000002 HC RX W HCPCS: Performed by: INTERNAL MEDICINE

## 2022-12-30 PROCEDURE — 94761 N-INVAS EAR/PLS OXIMETRY MLT: CPT

## 2022-12-30 PROCEDURE — 99231 SBSQ HOSP IP/OBS SF/LOW 25: CPT | Performed by: SURGERY

## 2022-12-30 PROCEDURE — A9537 TC99M MEBROFENIN: HCPCS | Performed by: CLINICAL NURSE SPECIALIST

## 2022-12-30 PROCEDURE — 1200000000 HC SEMI PRIVATE

## 2022-12-30 PROCEDURE — APPSS30 APP SPLIT SHARED TIME 16-30 MINUTES: Performed by: CLINICAL NURSE SPECIALIST

## 2022-12-30 PROCEDURE — 2700000000 HC OXYGEN THERAPY PER DAY

## 2022-12-30 PROCEDURE — 3430000000 HC RX DIAGNOSTIC RADIOPHARMACEUTICAL: Performed by: CLINICAL NURSE SPECIALIST

## 2022-12-30 PROCEDURE — 80053 COMPREHEN METABOLIC PANEL: CPT

## 2022-12-30 PROCEDURE — 2580000003 HC RX 258: Performed by: NURSE PRACTITIONER

## 2022-12-30 PROCEDURE — 6370000000 HC RX 637 (ALT 250 FOR IP): Performed by: INTERNAL MEDICINE

## 2022-12-30 PROCEDURE — 36415 COLL VENOUS BLD VENIPUNCTURE: CPT

## 2022-12-30 PROCEDURE — 84100 ASSAY OF PHOSPHORUS: CPT

## 2022-12-30 PROCEDURE — 78226 HEPATOBILIARY SYSTEM IMAGING: CPT

## 2022-12-30 PROCEDURE — 2580000003 HC RX 258: Performed by: INTERNAL MEDICINE

## 2022-12-30 PROCEDURE — 85027 COMPLETE CBC AUTOMATED: CPT

## 2022-12-30 RX ORDER — SODIUM CHLORIDE, SODIUM LACTATE, POTASSIUM CHLORIDE, CALCIUM CHLORIDE 600; 310; 30; 20 MG/100ML; MG/100ML; MG/100ML; MG/100ML
INJECTION, SOLUTION INTRAVENOUS CONTINUOUS
Status: DISCONTINUED | OUTPATIENT
Start: 2022-12-30 | End: 2022-12-31 | Stop reason: HOSPADM

## 2022-12-30 RX ADMIN — FAMOTIDINE 20 MG: 20 TABLET, FILM COATED ORAL at 20:17

## 2022-12-30 RX ADMIN — ONDANSETRON 4 MG: 2 INJECTION INTRAMUSCULAR; INTRAVENOUS at 17:57

## 2022-12-30 RX ADMIN — HYDROMORPHONE HYDROCHLORIDE 2 MG: 1 INJECTION, SOLUTION INTRAMUSCULAR; INTRAVENOUS; SUBCUTANEOUS at 05:08

## 2022-12-30 RX ADMIN — PANTOPRAZOLE SODIUM 40 MG: 40 TABLET, DELAYED RELEASE ORAL at 05:08

## 2022-12-30 RX ADMIN — HYDROMORPHONE HYDROCHLORIDE 2 MG: 1 INJECTION, SOLUTION INTRAMUSCULAR; INTRAVENOUS; SUBCUTANEOUS at 17:53

## 2022-12-30 RX ADMIN — FAMOTIDINE 20 MG: 20 TABLET, FILM COATED ORAL at 08:32

## 2022-12-30 RX ADMIN — Medication 6 MILLICURIE: at 14:38

## 2022-12-30 RX ADMIN — PIPERACILLIN AND TAZOBACTAM 3375 MG: 3; .375 INJECTION, POWDER, LYOPHILIZED, FOR SOLUTION INTRAVENOUS at 08:31

## 2022-12-30 RX ADMIN — PIPERACILLIN AND TAZOBACTAM 3375 MG: 3; .375 INJECTION, POWDER, LYOPHILIZED, FOR SOLUTION INTRAVENOUS at 01:28

## 2022-12-30 RX ADMIN — PIPERACILLIN AND TAZOBACTAM 3375 MG: 3; .375 INJECTION, POWDER, LYOPHILIZED, FOR SOLUTION INTRAVENOUS at 16:27

## 2022-12-30 RX ADMIN — ONDANSETRON 4 MG: 2 INJECTION INTRAMUSCULAR; INTRAVENOUS at 05:07

## 2022-12-30 RX ADMIN — HYDROMORPHONE HYDROCHLORIDE 2 MG: 1 INJECTION, SOLUTION INTRAMUSCULAR; INTRAVENOUS; SUBCUTANEOUS at 09:34

## 2022-12-30 RX ADMIN — SODIUM CHLORIDE, POTASSIUM CHLORIDE, SODIUM LACTATE AND CALCIUM CHLORIDE: 600; 310; 30; 20 INJECTION, SOLUTION INTRAVENOUS at 23:40

## 2022-12-30 RX ADMIN — PIPERACILLIN AND TAZOBACTAM 3375 MG: 3; .375 INJECTION, POWDER, LYOPHILIZED, FOR SOLUTION INTRAVENOUS at 23:42

## 2022-12-30 ASSESSMENT — PAIN DESCRIPTION - LOCATION
LOCATION: ABDOMEN

## 2022-12-30 ASSESSMENT — PAIN DESCRIPTION - ORIENTATION
ORIENTATION: LEFT;UPPER;LOWER;RIGHT
ORIENTATION: MID
ORIENTATION: MID

## 2022-12-30 ASSESSMENT — PAIN SCALES - GENERAL
PAINLEVEL_OUTOF10: 0
PAINLEVEL_OUTOF10: 8
PAINLEVEL_OUTOF10: 7
PAINLEVEL_OUTOF10: 9
PAINLEVEL_OUTOF10: 0
PAINLEVEL_OUTOF10: 9

## 2022-12-30 ASSESSMENT — PAIN DESCRIPTION - DESCRIPTORS
DESCRIPTORS: STABBING
DESCRIPTORS: CRAMPING;ACHING
DESCRIPTORS: ACHING;CRAMPING

## 2022-12-30 NOTE — PROGRESS NOTES
Throughout the shift patient rested in bed, gave pain medication twice. Last being this morning. Verbalized pain was worse and the GERD feeling was an aggravating factor. Did not have many liquids throughout the night patient voiced not being able to tolerate liquid diet from previous dayshift.

## 2022-12-30 NOTE — PROGRESS NOTES
[facility] SURGERY    PATIENT NAME: Nolvia Jay     TODAY'S DATE: 12/30/2022    CHIEF COMPLAINT: abd pain    INTERVAL HISTORY/HPI:    Pt reports he is feeling worse today with increased abd pain - located across upper abd, as well as dry heaves with liquids, hiccups. OBJECTIVE:  VITALS:  BP (!) 160/96   Pulse 85   Temp 97.7 °F (36.5 °C) (Oral)   Resp 18   Ht 6' (1.829 m)   Wt 300 lb (136.1 kg)   SpO2 91%   BMI 40.69 kg/m²     INTAKE/OUTPUT:    I/O last 3 completed shifts:  In: -   Out: 600 [Urine:600]  No intake/output data recorded. CONSTITUTIONAL:  awake and alert  LUNGS:  no crackles or wheezes   ABDOMEN:  soft, obese, tenderness noted in the epigastric region      Data:  CBC:   Recent Labs     12/28/22  0631 12/29/22  0610 12/30/22  0607   WBC 15.9* 12.5* 11.8*   HGB 12.8* 12.3* 13.5   HCT 38.2* 36.7* 40.2*    273 344       BMP:    Recent Labs     12/28/22  0631 12/29/22  0610 12/30/22  0607    135* 136   K 4.1 4.4 4.1    99 102   CO2 25 27 26   BUN 15 15 20   CREATININE 0.6* 0.7* 0.7*   GLUCOSE 136* 120* 126*       Hepatic:   Recent Labs     12/29/22  1032 12/30/22  0607   AST 18 14*   ALT 33 22   BILITOT 1.4* 1.1*   ALKPHOS 78 72       Mag:      Recent Labs     12/28/22  0631   MG 1.80        Phos:     Recent Labs     12/28/22  0631 12/29/22  0610 12/30/22  0607   PHOS 2.8 2.0* 3.0        INR: No results for input(s): INR in the last 72 hours. Radiology Review:   Images reviewed by wilda RUEDA pending      ASSESSMENT AND PLAN:  Probable acute cholecystitis - given worsening symptoms will obtain HIDA to confirm cholecystitis. IF this does not show cholecystitis, then will consult GI for their input    Continue with antibiotics for now.     Electronically signed by MARIETTA Sanchez - CNP     Surgery Staff    I have examined this patient, and read and agree with the note by Tracey Arrington CNP from today; more than half of the total time was spent by me on the encounter. Not sure that his symptoms are truly biliary in origin. Unable to visualize the GB or any gallstones on U/S due to contracted state of the gallbladder. If HIDA were to show normal filling of the GB, would get GI input, consider EGD to look for other etiologies of his symptoms.     Elo Sun MD

## 2022-12-30 NOTE — CARE COORDINATION
Chart reviewed day 3. Care per surgery and IM. Worsening symptoms today. HIDA pending. IPTA. Consider GI consult.  Irene García RN

## 2022-12-30 NOTE — PLAN OF CARE
Problem: Pain  Goal: Verbalizes/displays adequate comfort level or baseline comfort level  Outcome: Progressing     Problem: Pain  Goal: Control of acute pain  Outcome: Progressing     Problem: Safety - Adult  Goal: Free from fall injury  Outcome: Progressing

## 2022-12-30 NOTE — PROGRESS NOTES
Hospitalist Progress Note      PCP: Jennifer Chan DO    Date of Admission: 12/27/2022    Chief Complaint: RUQ/Epigastric pain    Subjective: no new c/o. Medications:  Reviewed    Infusion Medications    sodium chloride       Scheduled Medications    pantoprazole  40 mg Oral QAM AC    famotidine  20 mg Oral BID    sodium chloride flush  5-40 mL IntraVENous 2 times per day    piperacillin-tazobactam  3,375 mg IntraVENous Q8H     PRN Meds: oxyCODONE **OR** oxyCODONE, HYDROmorphone **OR** HYDROmorphone, sodium chloride flush, sodium chloride, ondansetron **OR** ondansetron, polyethylene glycol, acetaminophen **OR** acetaminophen    No intake or output data in the 24 hours ending 12/30/22 1220      Physical Exam Performed:    BP (!) 160/96   Pulse 85   Temp 97.7 °F (36.5 °C) (Oral)   Resp 18   Ht 6' (1.829 m)   Wt 300 lb (136.1 kg)   SpO2 91%   BMI 40.69 kg/m²     General appearance: No apparent distress, appears stated age and cooperative. HEENT: Pupils equal, round, and reactive to light. Conjunctivae/corneas clear. Neck: Supple, with full range of motion. No jugular venous distention. Trachea midline. Respiratory:  Normal respiratory effort. Clear to auscultation, bilaterally without Rales/Wheezes/Rhonchi. Cardiovascular: Regular rate and rhythm with normal S1/S2 without murmurs, rubs or gallops. Abdomen: Soft, non-tender, non-distended with normal bowel sounds. Musculoskeletal: No clubbing, cyanosis or edema bilaterally. Full range of motion without deformity. Skin: Skin color, texture, turgor normal.  No rashes or lesions. Neurologic:  Neurovascularly intact without any focal sensory/motor deficits.  Cranial nerves: II-XII intact, grossly non-focal.  Psychiatric: Alert and oriented, thought content appropriate, normal insight  Capillary Refill: Brisk,< 3 seconds   Peripheral Pulses: +2 palpable, equal bilaterally       Labs:   Recent Labs     12/28/22  0631 12/29/22  0610 12/30/22  0607   WBC 15.9* 12.5* 11.8*   HGB 12.8* 12.3* 13.5   HCT 38.2* 36.7* 40.2*    273 344       Recent Labs     12/28/22  0631 12/29/22  0610 12/30/22  0607    135* 136   K 4.1 4.4 4.1    99 102   CO2 25 27 26   BUN 15 15 20   CREATININE 0.6* 0.7* 0.7*   CALCIUM 8.8 9.1 8.7   PHOS 2.8 2.0* 3.0       Recent Labs     12/29/22  1032 12/30/22  0607   AST 18 14*   ALT 33 22   BILIDIR 0.5*  --    BILITOT 1.4* 1.1*   ALKPHOS 78 72       No results for input(s): INR in the last 72 hours. No results for input(s): Trista Carolyne in the last 72 hours. Urinalysis:      Lab Results   Component Value Date/Time    NITRU Negative 12/29/2022 11:04 AM    WBCUA 0-2 12/29/2022 11:04 AM    RBCUA 0-2 12/29/2022 11:04 AM    BLOODU Negative 12/29/2022 11:04 AM    SPECGRAV 1.025 12/29/2022 11:04 AM    GLUCOSEU Negative 12/29/2022 11:04 AM       Consults:    IP CONSULT TO GENERAL SURGERY  IP CONSULT TO SPIRITUAL SERVICES      Assessment/Plan:    Active Hospital Problems    Diagnosis     Acute cholecystitis [K81.0]      Priority: Medium    Cholecystitis [K81.9]      Priority: Medium       Cholecystitis - acute bacterial.  Started on Vanco/Zosyn in ED 27 Dec - Zosyn continued. General Surgery consulted and appreciated. PO/IV Narcotic analgesia as ordered. RUQ U/S 28 Dec non-diagnostic, likely 2nd to habitus. HIDA ordered w/ possible GI consult for EGD - NOT YET placed. HTN - w/out known CAD and no evidence of active signs/sxs of ischemia/failure. Currently controlled on home meds w/ vitals reviewed and documented as above. GERD - w/out active signs/sxs of dysphagia/odynophagia. No evidence of active PUD or hx of GI bleed. Controlled on home PPI - continue. Morbid Obesity -  With Body mass index is 40.69 kg/m². Complicating assessment and treatment. Placing patient at risk for multiple co-morbidities as well as early death and contributing to the patient's presentation. Counseled on weight loss. DEIDRA - likely 2nd to obesity. Controlled on home CPAP/BiPap - continued, w/ outpatient f/u as previously arranged. DVT Prophylaxis: IPC     Recent Labs     12/28/22  0631 12/29/22  0610 12/30/22  0607    273 344       Diet: Diet NPO Exceptions are: Ice Chips, Sips of Water with Meds, Popsicles  Code Status: Full Code      PT/OT Eval Status: not yet ordered.       Dispo - Patient is likely to remain in-house at least until Sat/Jordan 31 Dec/1 Petey pending clinical course and subspecialty Freddy Lockhart MD

## 2022-12-30 NOTE — PROGRESS NOTES
Shift assessment completed. Pt A&O x4, VSS. Pt reporting severe pain and feels that he is getting worse. Not tolerating clear liquids, has nausea and acid reflux. Non skid socks on, pt independent with ambulation. Bed locked and in lowest position. Call light and bedside table within reach. Will continue to monitor.

## 2022-12-31 VITALS
RESPIRATION RATE: 18 BRPM | WEIGHT: 300 LBS | HEIGHT: 72 IN | BODY MASS INDEX: 40.63 KG/M2 | HEART RATE: 72 BPM | DIASTOLIC BLOOD PRESSURE: 88 MMHG | TEMPERATURE: 98.6 F | SYSTOLIC BLOOD PRESSURE: 144 MMHG | OXYGEN SATURATION: 92 %

## 2022-12-31 LAB
ALBUMIN SERPL-MCNC: 3.4 G/DL (ref 3.4–5)
ANION GAP SERPL CALCULATED.3IONS-SCNC: 8 MMOL/L (ref 3–16)
BLOOD CULTURE, ROUTINE: NORMAL
BUN BLDV-MCNC: 19 MG/DL (ref 7–20)
CALCIUM SERPL-MCNC: 8.8 MG/DL (ref 8.3–10.6)
CHLORIDE BLD-SCNC: 101 MMOL/L (ref 99–110)
CO2: 30 MMOL/L (ref 21–32)
CREAT SERPL-MCNC: 0.9 MG/DL (ref 0.9–1.3)
CULTURE, BLOOD 2: NORMAL
GFR SERPL CREATININE-BSD FRML MDRD: >60 ML/MIN/{1.73_M2}
GLUCOSE BLD-MCNC: 95 MG/DL (ref 70–99)
HCT VFR BLD CALC: 38.8 % (ref 40.5–52.5)
HEMOGLOBIN: 12.7 G/DL (ref 13.5–17.5)
MCH RBC QN AUTO: 27.3 PG (ref 26–34)
MCHC RBC AUTO-ENTMCNC: 32.8 G/DL (ref 31–36)
MCV RBC AUTO: 83.4 FL (ref 80–100)
PDW BLD-RTO: 14 % (ref 12.4–15.4)
PHOSPHORUS: 2.9 MG/DL (ref 2.5–4.9)
PLATELET # BLD: 357 K/UL (ref 135–450)
PMV BLD AUTO: 7.7 FL (ref 5–10.5)
POTASSIUM SERPL-SCNC: 4.4 MMOL/L (ref 3.5–5.1)
RBC # BLD: 4.65 M/UL (ref 4.2–5.9)
SODIUM BLD-SCNC: 139 MMOL/L (ref 136–145)
WBC # BLD: 7.8 K/UL (ref 4–11)

## 2022-12-31 PROCEDURE — 2580000003 HC RX 258: Performed by: INTERNAL MEDICINE

## 2022-12-31 PROCEDURE — 99232 SBSQ HOSP IP/OBS MODERATE 35: CPT | Performed by: SURGERY

## 2022-12-31 PROCEDURE — 80069 RENAL FUNCTION PANEL: CPT

## 2022-12-31 PROCEDURE — 36415 COLL VENOUS BLD VENIPUNCTURE: CPT

## 2022-12-31 PROCEDURE — 85027 COMPLETE CBC AUTOMATED: CPT

## 2022-12-31 PROCEDURE — 6370000000 HC RX 637 (ALT 250 FOR IP): Performed by: INTERNAL MEDICINE

## 2022-12-31 PROCEDURE — 6360000002 HC RX W HCPCS: Performed by: INTERNAL MEDICINE

## 2022-12-31 RX ORDER — PANTOPRAZOLE SODIUM 40 MG/1
40 TABLET, DELAYED RELEASE ORAL
Qty: 30 TABLET | Refills: 0 | Status: SHIPPED | OUTPATIENT
Start: 2023-01-01 | End: 2023-01-31

## 2022-12-31 RX ORDER — AMOXICILLIN AND CLAVULANATE POTASSIUM 875; 125 MG/1; MG/1
1 TABLET, FILM COATED ORAL 2 TIMES DAILY
Qty: 14 TABLET | Refills: 0 | Status: SHIPPED | OUTPATIENT
Start: 2022-12-31 | End: 2023-01-07

## 2022-12-31 RX ORDER — HYDROCODONE BITARTRATE AND ACETAMINOPHEN 5; 325 MG/1; MG/1
1 TABLET ORAL EVERY 8 HOURS PRN
Qty: 12 TABLET | Refills: 0 | Status: SHIPPED | OUTPATIENT
Start: 2022-12-31 | End: 2023-01-05

## 2022-12-31 RX ORDER — OXYCODONE HYDROCHLORIDE 5 MG/1
5 TABLET ORAL EVERY 6 HOURS PRN
Qty: 20 TABLET | Refills: 0 | Status: SHIPPED | OUTPATIENT
Start: 2022-12-31 | End: 2023-01-05

## 2022-12-31 RX ADMIN — FAMOTIDINE 20 MG: 20 TABLET, FILM COATED ORAL at 08:10

## 2022-12-31 RX ADMIN — PIPERACILLIN AND TAZOBACTAM 3375 MG: 3; .375 INJECTION, POWDER, LYOPHILIZED, FOR SOLUTION INTRAVENOUS at 08:21

## 2022-12-31 ASSESSMENT — PAIN SCALES - GENERAL: PAINLEVEL_OUTOF10: 0

## 2022-12-31 NOTE — CONSULTS
Consult placed    Malcolm:surya  Date:12/31/2022,  Time:4:27 PM        Electronically signed by Olimpia Nicole on 12/31/2022 at 4:27 PM

## 2022-12-31 NOTE — PROGRESS NOTES
Pt verbal and written discharge instructions given with all questions answered. Scripts for medications given/discussed. Pt left in stable condition via wheelchair to private car.

## 2022-12-31 NOTE — DISCHARGE INSTRUCTIONS
Follow up with Dr. Amanda Arizmendi (general surgery)  Follow up with PCP in 1-2 weeks  Low fat diet    Call for increased pain, nausea/vomiting, fever, or not getting better

## 2022-12-31 NOTE — PROGRESS NOTES
Hospitalist Progress Note      PCP: Jeanette Prabhakar DO    Date of Admission: 12/27/2022    Chief Complaint: RUQ/Epigastric pain    Subjective: no new c/o. Medications:  Reviewed    Infusion Medications    lactated ringers 75 mL/hr at 12/30/22 2340    sodium chloride       Scheduled Medications    pantoprazole  40 mg Oral QAM AC    famotidine  20 mg Oral BID    sodium chloride flush  5-40 mL IntraVENous 2 times per day    piperacillin-tazobactam  3,375 mg IntraVENous Q8H     PRN Meds: oxyCODONE **OR** oxyCODONE, HYDROmorphone **OR** HYDROmorphone, sodium chloride flush, sodium chloride, ondansetron **OR** ondansetron, polyethylene glycol, acetaminophen **OR** acetaminophen    No intake or output data in the 24 hours ending 12/31/22 5294      Physical Exam Performed:    BP (!) 142/89   Pulse 72   Temp 98.3 °F (36.8 °C) (Oral)   Resp 18   Ht 6' (1.829 m)   Wt 300 lb (136.1 kg)   SpO2 91%   BMI 40.69 kg/m²     General appearance: No apparent distress, appears stated age and cooperative. HEENT: Pupils equal, round, and reactive to light. Conjunctivae/corneas clear. Neck: Supple, with full range of motion. No jugular venous distention. Trachea midline. Respiratory:  Normal respiratory effort. Clear to auscultation, bilaterally without Rales/Wheezes/Rhonchi. Cardiovascular: Regular rate and rhythm with normal S1/S2 without murmurs, rubs or gallops. Abdomen: Soft, non-tender, non-distended with normal bowel sounds. Musculoskeletal: No clubbing, cyanosis or edema bilaterally. Full range of motion without deformity. Skin: Skin color, texture, turgor normal.  No rashes or lesions. Neurologic:  Neurovascularly intact without any focal sensory/motor deficits.  Cranial nerves: II-XII intact, grossly non-focal.  Psychiatric: Alert and oriented, thought content appropriate, normal insight  Capillary Refill: Brisk,< 3 seconds   Peripheral Pulses: +2 palpable, equal bilaterally       Labs:   Recent Labs 12/29/22  0610 12/30/22  0607 12/31/22  0548   WBC 12.5* 11.8* 7.8   HGB 12.3* 13.5 12.7*   HCT 36.7* 40.2* 38.8*    344 357       Recent Labs     12/29/22  0610 12/30/22  0607 12/31/22  0548   * 136 139   K 4.4 4.1 4.4   CL 99 102 101   CO2 27 26 30   BUN 15 20 19   CREATININE 0.7* 0.7* 0.9   CALCIUM 9.1 8.7 8.8   PHOS 2.0* 3.0 2.9       Recent Labs     12/29/22  1032 12/30/22  0607   AST 18 14*   ALT 33 22   BILIDIR 0.5*  --    BILITOT 1.4* 1.1*   ALKPHOS 78 72       No results for input(s): INR in the last 72 hours. No results for input(s): Ardelle Chalk in the last 72 hours. Urinalysis:      Lab Results   Component Value Date/Time    NITRU Negative 12/29/2022 11:04 AM    WBCUA 0-2 12/29/2022 11:04 AM    RBCUA 0-2 12/29/2022 11:04 AM    BLOODU Negative 12/29/2022 11:04 AM    SPECGRAV 1.025 12/29/2022 11:04 AM    GLUCOSEU Negative 12/29/2022 11:04 AM       Consults:    IP CONSULT TO GENERAL SURGERY  IP CONSULT TO SPIRITUAL SERVICES      Assessment/Plan:    Active Hospital Problems    Diagnosis     Acute cholecystitis [K81.0]      Priority: Medium    Cholecystitis [K81.9]      Priority: Medium       Cholecystitis - acute bacterial.  Started on Vanco/Zosyn in ED 27 Dec - Zosyn continued. General Surgery consulted and appreciated. PO/IV Narcotic analgesia as ordered. RUQ U/S 28 Dec non-diagnostic, likely 2nd to habitus. HIDA 30 Dec w/ non-visualization of the GB suggesting cystic duct obstruction - MRCP ordered and pending. GI consulted and appreciated in advance for possible ERCP    HTN - w/out known CAD and no evidence of active signs/sxs of ischemia/failure. Currently controlled on home meds w/ vitals reviewed and documented as above. GERD - w/out active signs/sxs of dysphagia/odynophagia. No evidence of active PUD or hx of GI bleed. Controlled on home PPI - continue. Morbid Obesity -  With Body mass index is 40.69 kg/m². Complicating assessment and treatment.  Placing patient at risk for multiple co-morbidities as well as early death and contributing to the patient's presentation. Counseled on weight loss. DEIDRA - likely 2nd to obesity. Controlled on home CPAP/BiPap - continued, w/ outpatient f/u as previously arranged. DVT Prophylaxis: IPC     Recent Labs     12/29/22  0610 12/30/22  0607 12/31/22  0548    344 357       Diet: Diet NPO Exceptions are: Ice Chips, Sips of Water with Meds, Popsicles  Code Status: Full Code      PT/OT Eval Status: not yet ordered.       Dispo - Patient is likely to remain in-house at least until Sat/[place] 31 Dec/1 Petey pending clinical course and subspecialty Hawa Rojas MD

## 2022-12-31 NOTE — CONSULTS
Consultation Note    Patient Name: Jodie Steven  : 1985  Age: 40 y.o. Admitting Physician: Linda Dawn MD   Date of Admission: 2022  1:01 PM   Primary Care Physician: Bennie Farley DO        Jodie Steven is being seen at the request of Linda Dawn MD for ABDOMEN PAIN, CHOLECYSTITIS. History of Present Illness:  71-year-old morbidly obese male admitted with right upper quadrant and epigastric abdominal pain on . He was seen initially at [facility] ED and then subsequently admitted here at [facility].  He describes the pain as severe pain in the epigastric/right upper quadrant region radiating to back associated with nausea and vomiting and with chills. He denies having any hematemesis. No hematochezia or melena. He started feeling better upon admission but after resuming diet he again started having pain. CT abdomen on  showed contracted gallbladder with pericholecystic edema and hepatic steatosis  Ultrasound abdomen on  was nondiagnostic due to limited exam  HIDA scan done yesterday showed nonvisualization of gallbladder suggesting cystic duct obstruction and cholecystitis    Labs reviewed. AST 22. ALT 14. Total bilirubin 1.1    GI History:  History of gallstones  History of gastric sleeve surgery done in 2017. He lost 120 pounds after surgery but has gained 30 pounds since then.         Past Medical History:  Past Medical History:   Diagnosis Date    GERD (gastroesophageal reflux disease)     Hypertension, essential 2017    Sleep apnea     USES BI-PAP        Past Surgical History:  Past Surgical History:   Procedure Laterality Date    SLEEVE GASTRECTOMY  2017    LAPAROSCOPIC SLEEVE GASTRECTOMY -ETHICON, TRANS-ABDOMINAL PLANE BLOCK    UPPER GASTROINTESTINAL ENDOSCOPY  2017        Historical Medications:  Prior to Visit Medications    Not on [facility] Medications:  Current Facility-Administered Medications: lactated ringers infusion, , IntraVENous, Continuous  pantoprazole (PROTONIX) tablet 40 mg, 40 mg, Oral, QAM AC  famotidine (PEPCID) tablet 20 mg, 20 mg, Oral, BID  oxyCODONE (ROXICODONE) immediate release tablet 5 mg, 5 mg, Oral, Q4H PRN **OR** oxyCODONE (ROXICODONE) immediate release tablet 10 mg, 10 mg, Oral, Q4H PRN  HYDROmorphone (DILAUDID) injection 1 mg, 1 mg, IntraVENous, Q4H PRN **OR** HYDROmorphone (DILAUDID) injection 2 mg, 2 mg, IntraVENous, Q4H PRN  sodium chloride flush 0.9 % injection 5-40 mL, 5-40 mL, IntraVENous, 2 times per day  sodium chloride flush 0.9 % injection 5-40 mL, 5-40 mL, IntraVENous, PRN  0.9 % sodium chloride infusion, , IntraVENous, PRN  ondansetron (ZOFRAN-ODT) disintegrating tablet 4 mg, 4 mg, Oral, Q8H PRN **OR** ondansetron (ZOFRAN) injection 4 mg, 4 mg, IntraVENous, Q6H PRN  polyethylene glycol (GLYCOLAX) packet 17 g, 17 g, Oral, Daily PRN  acetaminophen (TYLENOL) tablet 650 mg, 650 mg, Oral, Q6H PRN **OR** acetaminophen (TYLENOL) suppository 650 mg, 650 mg, Rectal, Q6H PRN  piperacillin-tazobactam (ZOSYN) 3,375 mg in dextrose 5 % 50 mL IVPB extended infusion (mini-bag), 3,375 mg, IntraVENous, Q8H     Social History:   Social History       Tobacco History       Smoking Status  Never      Smokeless Tobacco Use  Former              Alcohol History       Alcohol Use Status  Yes Comment  occassional drinker              Drug Use       Drug Use Status  No              Sexual Activity       Sexually Active  Yes Partners  Female                     Family History:  Family History   Problem Relation Age of Onset    COPD Mother     Arthritis Mother     High Blood Pressure Father     High Cholesterol Father     High Blood Pressure Paternal Uncle     High Cholesterol Paternal Uncle     High Cholesterol Paternal Grandmother     Clotting Disorder Paternal Grandmother     High Blood Pressure Paternal Grandfather     High Cholesterol Paternal Grandfather         Allergies:  No Known Allergies ROS:   General: No fever or weight change  Hematologic: No unexpected submucosal bleeding or bruising  HEENT: No sore throat or facial pain  Respiratory: No cough or dyspnea  Cardiovascular: No angina or dependent edema  Gastrointestinal: See HPI  Musculoskeletal: No usual joint pain or stiffness  Skin: No skin eruptions or changing lesions  Neurologic: No focal weakness or numbness  Psychiatric: No anxiety or sleep disturbance    Physical Exam:  Vital Signs:   Vitals:    12/31/22 0805   BP: (!) 142/89   Pulse: 72   Resp: 18   Temp: 98.3 °F (36.8 °C)   SpO2: 91%       General: Well-nourished, well-developed  HEENT: Sclera anicteric, mucosal membranes moist  Cardiovascular: Regular rate and rhythm. No murmurs. Respiratory: Respirations nonlabored, no crepitus  GI: Abdomen OBESE nondistended, soft, and nontender. Normal active bowel sounds. No masses palpable. Rectal: Deferred  Musculoskeletal: No pitting edema of the lower legs. Neurological: Gross memory appears intact. Patient is alert and oriented      Recent Imaging:   NM HEPATOBILIARY  Narrative: EXAMINATION:  NUCLEAR MEDICINE HEPATOBILIARY SCINTIGRAPHY (HIDA SCAN). 12/30/2022 1:24 pm    TECHNIQUE:  Approximately 6.0 mCi Tc-99m Mebrofenin (Choletec) was administered IV. Then, dynamic images of the abdomen were obtained in the anterior projection  for 60 min(s). A right lateral view was also obtained at 60 min(s). COMPARISON:  Ultrasound 12/28/2022. HISTORY:  ORDERING SYSTEM PROVIDED HISTORY: evaluate gallbladder  - acute cholecystitis  TECHNOLOGIST PROVIDED HISTORY:  Reason for exam:->evaluate gallbladder  - acute cholecystitis    FINDINGS:  There is normal uptake of radiotracer in the liver. There is normal visualization of the bile ducts and small bowel. Gallbladder  is not visualized with imaging performed for 2 hours. Impression: Nonvisualization of the gallbladder suggesting cystic duct obstruction and  cholecystitis.        Labs: Recent Labs     12/29/22  0610 12/29/22  1032 12/30/22  0607 12/31/22  0548   HGB 12.3*  --  13.5 12.7*   WBC 12.5*  --  11.8* 7.8   PROT  --  6.8 6.7  --    LABALBU 3.6 3.6 3.3* 3.4   ALKPHOS  --  78 72  --    ALT  --  33 22  --    AST  --  18 14*  --    BILITOT  --  1.4* 1.1*  --    BILIDIR  --  0.5*  --   --    IBILI  --  0.9  --   --         Assessment:    71-year-old morbidly obese male with previous history of gastric sleeve surgery in 2017 admitted with severe right upper quadrant and epigastric abdominal pain. CT scan and HIDA scan findings suggestive of acute cholecystitis. LFTs reviewed, total bilirubin 1.1. No evidence of any bile duct obstruction. Given above findings, I do not think MRCP is needed at this time. Plan   Continue with IV antibiotics   Surgery team is following up with patient. Patient has been improving on IV antibiotics. Timing of cholecystectomy as per surgery team recommendations. No GI intervention planned. Please call us back with any question or concerns          Ricardo Terry MD  [facility]    [phone].  Also available via [employer]

## 2022-12-31 NOTE — PROGRESS NOTES
Pt alert and oriented. VSS. Assessment completed as charted. Denies pain or needs at present time. Call light and bedside table within reach. Bed locked and in lowest position.

## 2022-12-31 NOTE — PROGRESS NOTES
[facility]    PATIENT NAME: Blaise Waggoner     TODAY'S DATE: 12/31/2022    CHIEF COMPLAINT: He feels hungry    INTERVAL HISTORY/HPI:    Pt states he feels hungry. He had a bowel movement yesterday and feels much better. He is still having some pain but it is significantly improved. He denies nausea. No fevers. REVIEW OF SYSTEMS:  Pertinent positives and negatives as per interval history section    OBJECTIVE:  VITALS:  BP (!) 142/89   Pulse 72   Temp 98.3 °F (36.8 °C) (Oral)   Resp 18   Ht 6' (1.829 m)   Wt 300 lb (136.1 kg)   SpO2 91%   BMI 40.69 kg/m²     INTAKE/OUTPUT:    No intake/output data recorded. No intake/output data recorded. CONSTITUTIONAL:  awake and alert  LUNGS:  Respirations easy and unlabored, clear to auscultation  CARD:  regular rate and rhythm  ABDOMEN:  normal bowel sounds, soft, obese, very mild tenderness noted right upper quadrant    Data:  CBC:   Recent Labs     12/29/22  0610 12/30/22  0607 12/31/22  0548   WBC 12.5* 11.8* 7.8   HGB 12.3* 13.5 12.7*   HCT 36.7* 40.2* 38.8*    344 357     BMP:    Recent Labs     12/29/22  0610 12/30/22  0607 12/31/22  0548   * 136 139   K 4.4 4.1 4.4   CL 99 102 101   CO2 27 26 30   BUN 15 20 19   CREATININE 0.7* 0.7* 0.9   GLUCOSE 120* 126* 95     Hepatic:   Recent Labs     12/29/22  1032 12/30/22  0607   AST 18 14*   ALT 33 22   BILITOT 1.4* 1.1*   ALKPHOS 78 72     Mag:    No results for input(s): MG in the last 72 hours. Phos:     Recent Labs     12/29/22  0610 12/30/22  0607 12/31/22  0548   PHOS 2.0* 3.0 2.9      INR: No results for input(s): INR in the last 72 hours. Radiology Review:  *Imaging personally reviewed by me. HIDA shows nonvisualization of the gallbladder      ASSESSMENT AND PLAN:  Acute calculus cholecystitis, clinically significantly improved today. His minimally elevated liver enzymes are likely reactive secondary to the cholecystitis, so I do not think he needs an MRCP.   Plan to start low-fat full liquid diet today. If he tolerates this he can be discharged on oral antibiotics and a low-fat diet.   We will plan for interval cholecystectomy in a few weeks     Electronically signed by Vicente Betancur MD     [id number]

## 2023-01-02 NOTE — DISCHARGE SUMMARY
Hospital Medicine Discharge Summary    Patient ID: Oli Garcia      Patient's PCP: Ashleigh Rios DO    Admit Date: 12/27/2022     Discharge Date: 12/31/2022      Admitting Physician: Alice Smith MD     Discharge Physician: Alice Smith MD     Discharge Diagnoses: Active Hospital Problems    Diagnosis     Acute cholecystitis [K81.0]      Priority: Medium    Cholecystitis [K81.9]      Priority: Medium       The patient was seen and examined on day of discharge and this discharge summary is in conjunction with any daily progress note from day of discharge. Hospital Course:        Cholecystitis - acute bacterial.  Started on Vanco/Zosyn in ED 27 Dec - Zosyn continued. General Surgery consulted and appreciated. PO/IV Narcotic analgesia as ordered. RUQ U/S 28 Dec non-diagnostic, likely 2nd to habitus. HIDA 30 Dec w/ non-visualization of the GB suggesting cystic duct obstruction. Plan for close outpt f/u for possible CCY     HTN - w/out known CAD and no evidence of active signs/sxs of ischemia/failure. Currently controlled on home meds      GERD - w/out active signs/sxs of dysphagia/odynophagia. No evidence of active PUD or hx of GI bleed. Controlled on home PPI - continue. Morbid Obesity -  With Body mass index is 40.69 kg/m². Complicating assessment and treatment. Placing patient at risk for multiple co-morbidities as well as early death and contributing to the patient's presentation. Counseled on weight loss. DEIDRA - likely 2nd to obesity. Controlled on home CPAP/BiPap - continued, w/ outpatient f/u as previously arranged. Labs:  For convenience and continuity at follow-up the following most recent labs are provided:      CBC:    Lab Results   Component Value Date/Time    WBC 7.8 12/31/2022 05:48 AM    HGB 12.7 12/31/2022 05:48 AM    HCT 38.8 12/31/2022 05:48 AM     12/31/2022 05:48 AM       Renal:    Lab Results   Component Value Date/Time     12/31/2022 05:48 AM K 4.4 12/31/2022 05:48 AM    K 3.3 12/27/2022 05:17 AM     12/31/2022 05:48 AM    CO2 30 12/31/2022 05:48 AM    BUN 19 12/31/2022 05:48 AM    CREATININE 0.9 12/31/2022 05:48 AM    CALCIUM 8.8 12/31/2022 05:48 AM    PHOS 2.9 12/31/2022 05:48 AM         Significant Diagnostic Studies    Radiology:   NM HEPATOBILIARY   Final Result   Nonvisualization of the gallbladder suggesting cystic duct obstruction and   cholecystitis. US ABDOMEN LIMITED   Final Result   Severely near-nondiagnostic limited examination. Right kidney appears within   normal limits. Consults:     IP CONSULT TO GENERAL SURGERY  IP CONSULT TO SPIRITUAL SERVICES  IP CONSULT TO GI    Disposition: Home     Condition at Discharge: Stable    Discharge Instructions/Follow-up:  w/ PCP 1-2 weeks and subspecialists as arranged. Code Status:  Full Code    Activity: activity as tolerated    Diet: regular diet      Discharge Medications:     Discharge Medication List as of 12/31/2022  4:59 PM             Details   oxyCODONE (ROXICODONE) 5 MG immediate release tablet Take 1 tablet by mouth every 6 hours as needed for Pain for up to 5 days. Max Daily Amount: 20 mg, Disp-20 tablet, R-0Print      pantoprazole (PROTONIX) 40 MG tablet Take 1 tablet by mouth every morning (before breakfast), Disp-30 tablet, R-0Print      amoxicillin-clavulanate (AUGMENTIN) 875-125 MG per tablet Take 1 tablet by mouth 2 times daily for 7 days, Disp-14 tablet, R-0Print             Time Spent on discharge is more than 35 minutes in the examination, evaluation, counseling and review of medications and discharge plan. Signed:    Je Conrad MD   1/2/2023      Thank you Nichelle Gayle DO for the opportunity to be involved in this patient's care. If you have any questions or concerns please feel free to contact me at [phone].

## 2023-01-03 ENCOUNTER — TELEPHONE (OUTPATIENT)
Dept: SURGERY | Age: 38
End: 2023-01-03

## 2023-01-03 NOTE — TELEPHONE ENCOUNTER
Patient called to schedule surgery because he was seen in patient for Gall Bladder. He stated that he needs to have this done soon because his son is going through chemo and will  be re-admitted soon.

## 2023-01-04 ENCOUNTER — TELEPHONE (OUTPATIENT)
Dept: SURGERY | Age: 38
End: 2023-01-04

## 2023-01-04 NOTE — TELEPHONE ENCOUNTER
Patient is scheduled for a robotic cholecystectomy with Dr. Rah Esposito on 1/20/23 at 7:30am, [facility] - NPO after midnight char before surgery - Patient will need  day of surgery - Patient had H&P completed in hospital - Patient understood and agreed with above noted.

## 2023-01-04 NOTE — PROGRESS NOTES
Vinicius Sol    Age 40 y.o.    male    1985    MRN [id number]    1/20/2023  Arrival Time_____________  OR Time____________109 Min     Procedure(s):  ROBOTIC CHOLECYSTECTOMY WITH INTRAOPATIVE CHOLANGIOGRAM, POSSIBLE OPEN PROCEDURE                      General   Surgeon(s):  Samir Dao, MD      DAY ADMIT ___  SDS/OP ___  OUTPT IN BED ___        Phone [phone] (home)     PCP _____________________ Phone_________________ Epic ( ) Epic CE ( ) Appt ________    ADDITIONAL INFO __________________________________ Cardio/Consult _____________    NOTES _____________________________________________________________________    ____________________________________________________________________________    PAT APPT DATE:________ TIME: ________  FAXED QAD: _______  (__) H&P w/ Hospitalist  __________________________________________________________________________  Preop Nurse phone screen complete: _____________  (__) CBC     (__) W/ DIFF ___________     (__) Hgb A1C    ___________  (__) CHEST X RAY   __________  (__) LIPID PROFILE  ___________  (__) EKG   __________  (__) PT/PTT   ___________  (__) PFT's   __________  (__) BMP   ___________  (__) CAROTIDS  __________  (__) CMP   ___________  (__) VEIN MAPPING  __________  (__) U/A   ___________  (__) HISTORY & PHYSICAL __________  (__) URINE C & S  ___________  (__) CARDIAC CLEARANCE __________  (__) U/A W/ FLEX  ___________  (__) PULM.  CLEARANCE __________  (__) SERUM PREGNANCY ___________  (__) Check Epic DOS orders __________  (__) TYPE & SCREEN __________repeat ( ) (__)  __________________ __________  (__) ALBUMIN   ___________  (__)  __________________ __________  (__) TRANSFERRIN  ___________  (__)  __________________ __________  (__) LIVER PROFILE  ___________  (__)  __________________ __________  (__) MRSA NASAL SWAB ___________  (__) URINE PREG DOS __________  (__) SED RATE  ___________  (__) BLOOD SUGAR DOS __________  (__) C-REACTIVE PROTEIN ___________    (__) VITAMIN D HYDROXY ___________  (__) BLOOD THINNERS __________    (__) ACE/ ARBS: _____________________     (__) BETABLOCKERS __________________

## 2023-01-17 NOTE — PROGRESS NOTES
1. Do not eat or drink anything after 12 midnight prior to surgery. This includes no water, chewing gum mints, or ice chips. You may brush your teeth and gargle the day of surgery but DO NOT SWALLOW THE WATER. 2. Please see your family doctor/pediatrician for a history and physical and/or concerning medications. Bring any test results/reports from your physician's office. If you are under the care of a heart doctor or specialist please be aware that you may be asked to see him or her for clearance. 3. You may be asked to stop blood thinners such as Coumadin, Plavix, Fragmin, and Lovenox or Anti-inflammatories such as Aspirin, Ibuprofen, Advil, and Naproxen prior to your surgery. Please check with your doctor before stopping these or any other medications. 4. Do not smoke, and do not drink any alcoholic beverages 24 hours prior to surgery. 5. You MUST make arrangements for a responsible adult to take you home after your surgery. For your safety, you will not be allowed to leave alone or drive yourself home. Your surgery will be cancelled if you do not have a ride home. Also for your safety, it is strongly suggested someone stay with you the first 24 hrs after your surgery. 6. A parent/legal guardian must accompany a child scheduled for surgery and plan to stay at the hospital until the child is discharged. Please do not bring other children with you. 7. For your comfort,please wear simple, loose fitting clothing to the hospital.  Please do not bring valuables (money, credit cards, checkbooks, etc.) Do not wear any makeup (including no eye makeup) or nail polish on your fingers or toes. 8. For your safety, please DO NOT wear any jewelry or piercings on day of surgery. All body piercing jewelry must be removed. 9. If you have dentures, they will be removed before going to the OR; for your convenience we will provide you with a container.   If you wear contact lenses or glasses, they will be removed, they will be removed, please bring a case for them. 10. If appicable,Please see your family doctor/pediatrician for a history & physical and/or concerning medications. Bring any test results/reports from your physician's office. 11. Remember to bring Blood Bank bracelet to the hospital on the day of surgery. 12. If you have a Living Will and Durable Power of  for Healthcare, please bring in a copy. 15. Notify your Surgeon if you develop any illness between now and surgery  time, cough, cold, fever, sore throat, nausea, vomiting, etc.  Please notify your surgeon if you experience dizziness, shortness of breath or blurred vision between now & the time of your surgery   14. DO NOT shave your operative site 96 hours prior to surgery. For face & neck surgery, men may use an electric razor 48 hours prior to surgery. 15. Shower the night before surgery with __X_Antibacterial soap ___Hibiclens   16. To provide excellent care visitors will be limited to one in the room at any given time. 17.  Please bring picture ID and insurance card. 18.  Visit our web site for additional information:  [facility]/surgery.

## 2023-01-20 ENCOUNTER — HOSPITAL ENCOUNTER (OUTPATIENT)
Age: 38
Setting detail: OUTPATIENT SURGERY
Discharge: HOME | End: 2023-01-20
Attending: SURGERY | Admitting: SURGERY
Payer: COMMERCIAL

## 2023-01-20 ENCOUNTER — ANESTHESIA EVENT (OUTPATIENT)
Dept: OPERATING ROOM | Age: 38
End: 2023-01-20
Payer: COMMERCIAL

## 2023-01-20 ENCOUNTER — APPOINTMENT (OUTPATIENT)
Dept: GENERAL RADIOLOGY | Age: 38
End: 2023-01-20
Attending: SURGERY
Payer: COMMERCIAL

## 2023-01-20 ENCOUNTER — ANESTHESIA (OUTPATIENT)
Dept: OPERATING ROOM | Age: 38
End: 2023-01-20
Payer: COMMERCIAL

## 2023-01-20 VITALS
OXYGEN SATURATION: 91 % | SYSTOLIC BLOOD PRESSURE: 137 MMHG | HEIGHT: 72 IN | TEMPERATURE: 96 F | DIASTOLIC BLOOD PRESSURE: 85 MMHG | WEIGHT: 300 LBS | HEART RATE: 81 BPM | RESPIRATION RATE: 20 BRPM | BODY MASS INDEX: 40.63 KG/M2

## 2023-01-20 DIAGNOSIS — K81.9 CHOLECYSTITIS, UNSPECIFIED: ICD-10-CM

## 2023-01-20 DIAGNOSIS — G89.18 POSTOPERATIVE PAIN: Primary | ICD-10-CM

## 2023-01-20 PROCEDURE — 2500000003 HC RX 250 WO HCPCS: Performed by: SURGERY

## 2023-01-20 PROCEDURE — 6370000000 HC RX 637 (ALT 250 FOR IP): Performed by: ANESTHESIOLOGY

## 2023-01-20 PROCEDURE — 47562 LAPAROSCOPIC CHOLECYSTECTOMY: CPT | Performed by: SURGERY

## 2023-01-20 PROCEDURE — 3700000000 HC ANESTHESIA ATTENDED CARE: Performed by: SURGERY

## 2023-01-20 PROCEDURE — 7100000000 HC PACU RECOVERY - FIRST 15 MIN: Performed by: SURGERY

## 2023-01-20 PROCEDURE — 2580000003 HC RX 258: Performed by: ANESTHESIOLOGY

## 2023-01-20 PROCEDURE — 2580000003 HC RX 258: Performed by: SURGERY

## 2023-01-20 PROCEDURE — 3600000019 HC SURGERY ROBOT ADDTL 15MIN: Performed by: SURGERY

## 2023-01-20 PROCEDURE — 3600000009 HC SURGERY ROBOT BASE: Performed by: SURGERY

## 2023-01-20 PROCEDURE — 88304 TISSUE EXAM BY PATHOLOGIST: CPT

## 2023-01-20 PROCEDURE — 6360000002 HC RX W HCPCS: Performed by: ANESTHESIOLOGY

## 2023-01-20 PROCEDURE — 3700000001 HC ADD 15 MINUTES (ANESTHESIA): Performed by: SURGERY

## 2023-01-20 PROCEDURE — 6360000002 HC RX W HCPCS: Performed by: SURGERY

## 2023-01-20 PROCEDURE — 2500000003 HC RX 250 WO HCPCS: Performed by: NURSE ANESTHETIST, CERTIFIED REGISTERED

## 2023-01-20 PROCEDURE — S2900 ROBOTIC SURGICAL SYSTEM: HCPCS | Performed by: SURGERY

## 2023-01-20 PROCEDURE — 2709999900 HC NON-CHARGEABLE SUPPLY: Performed by: SURGERY

## 2023-01-20 PROCEDURE — 6360000002 HC RX W HCPCS: Performed by: NURSE ANESTHETIST, CERTIFIED REGISTERED

## 2023-01-20 PROCEDURE — 7100000001 HC PACU RECOVERY - ADDTL 15 MIN: Performed by: SURGERY

## 2023-01-20 RX ORDER — ROCURONIUM BROMIDE 10 MG/ML
INJECTION, SOLUTION INTRAVENOUS PRN
Status: DISCONTINUED | OUTPATIENT
Start: 2023-01-20 | End: 2023-01-20 | Stop reason: SDUPTHER

## 2023-01-20 RX ORDER — FENTANYL CITRATE 50 UG/ML
INJECTION, SOLUTION INTRAMUSCULAR; INTRAVENOUS PRN
Status: DISCONTINUED | OUTPATIENT
Start: 2023-01-20 | End: 2023-01-20 | Stop reason: SDUPTHER

## 2023-01-20 RX ORDER — SODIUM CHLORIDE 0.9 % (FLUSH) 0.9 %
5-40 SYRINGE (ML) INJECTION EVERY 12 HOURS SCHEDULED
Status: DISCONTINUED | OUTPATIENT
Start: 2023-01-20 | End: 2023-01-20 | Stop reason: HOSPADM

## 2023-01-20 RX ORDER — ONDANSETRON 4 MG/1
4 TABLET, FILM COATED ORAL 3 TIMES DAILY PRN
Qty: 15 TABLET | Refills: 0 | Status: SHIPPED | OUTPATIENT
Start: 2023-01-20

## 2023-01-20 RX ORDER — LIDOCAINE HYDROCHLORIDE 10 MG/ML
2 INJECTION, SOLUTION INFILTRATION; PERINEURAL
Status: DISCONTINUED | OUTPATIENT
Start: 2023-01-20 | End: 2023-01-20 | Stop reason: HOSPADM

## 2023-01-20 RX ORDER — DIPHENHYDRAMINE HYDROCHLORIDE 50 MG/ML
12.5 INJECTION INTRAMUSCULAR; INTRAVENOUS
Status: DISCONTINUED | OUTPATIENT
Start: 2023-01-20 | End: 2023-01-20 | Stop reason: HOSPADM

## 2023-01-20 RX ORDER — DEXAMETHASONE SODIUM PHOSPHATE 10 MG/ML
INJECTION INTRAMUSCULAR; INTRAVENOUS PRN
Status: DISCONTINUED | OUTPATIENT
Start: 2023-01-20 | End: 2023-01-20 | Stop reason: SDUPTHER

## 2023-01-20 RX ORDER — ONDANSETRON 2 MG/ML
4 INJECTION INTRAMUSCULAR; INTRAVENOUS
Status: DISCONTINUED | OUTPATIENT
Start: 2023-01-20 | End: 2023-01-20 | Stop reason: HOSPADM

## 2023-01-20 RX ORDER — MIDAZOLAM HYDROCHLORIDE 1 MG/ML
INJECTION INTRAMUSCULAR; INTRAVENOUS PRN
Status: DISCONTINUED | OUTPATIENT
Start: 2023-01-20 | End: 2023-01-20 | Stop reason: SDUPTHER

## 2023-01-20 RX ORDER — SODIUM CHLORIDE, SODIUM LACTATE, POTASSIUM CHLORIDE, AND CALCIUM CHLORIDE .6; .31; .03; .02 G/100ML; G/100ML; G/100ML; G/100ML
IRRIGANT IRRIGATION PRN
Status: DISCONTINUED | OUTPATIENT
Start: 2023-01-20 | End: 2023-01-20 | Stop reason: HOSPADM

## 2023-01-20 RX ORDER — MEPERIDINE HYDROCHLORIDE 50 MG/ML
12.5 INJECTION INTRAMUSCULAR; INTRAVENOUS; SUBCUTANEOUS EVERY 5 MIN PRN
Status: DISCONTINUED | OUTPATIENT
Start: 2023-01-20 | End: 2023-01-20 | Stop reason: HOSPADM

## 2023-01-20 RX ORDER — HYDROCODONE BITARTRATE AND ACETAMINOPHEN 5; 325 MG/1; MG/1
1-2 TABLET ORAL EVERY 4 HOURS PRN
Qty: 18 TABLET | Refills: 0 | Status: SHIPPED | OUTPATIENT
Start: 2023-01-20 | End: 2023-01-23

## 2023-01-20 RX ORDER — PHENYLEPHRINE HCL IN 0.9% NACL 1 MG/10 ML
SYRINGE (ML) INTRAVENOUS PRN
Status: DISCONTINUED | OUTPATIENT
Start: 2023-01-20 | End: 2023-01-20 | Stop reason: SDUPTHER

## 2023-01-20 RX ORDER — LIDOCAINE HYDROCHLORIDE 20 MG/ML
INJECTION, SOLUTION INFILTRATION; PERINEURAL PRN
Status: DISCONTINUED | OUTPATIENT
Start: 2023-01-20 | End: 2023-01-20 | Stop reason: SDUPTHER

## 2023-01-20 RX ORDER — SODIUM CHLORIDE 9 MG/ML
25 INJECTION, SOLUTION INTRAVENOUS PRN
Status: DISCONTINUED | OUTPATIENT
Start: 2023-01-20 | End: 2023-01-20 | Stop reason: HOSPADM

## 2023-01-20 RX ORDER — HYDROCODONE BITARTRATE AND ACETAMINOPHEN 5; 325 MG/1; MG/1
1 TABLET ORAL EVERY 6 HOURS PRN
Status: DISCONTINUED | OUTPATIENT
Start: 2023-01-20 | End: 2023-01-20 | Stop reason: HOSPADM

## 2023-01-20 RX ORDER — INDOCYANINE GREEN AND WATER 25 MG
5 KIT INJECTION ONCE
Status: COMPLETED | OUTPATIENT
Start: 2023-01-20 | End: 2023-01-20

## 2023-01-20 RX ORDER — SODIUM CHLORIDE, SODIUM LACTATE, POTASSIUM CHLORIDE, CALCIUM CHLORIDE 600; 310; 30; 20 MG/100ML; MG/100ML; MG/100ML; MG/100ML
INJECTION, SOLUTION INTRAVENOUS CONTINUOUS
Status: DISCONTINUED | OUTPATIENT
Start: 2023-01-20 | End: 2023-01-20 | Stop reason: HOSPADM

## 2023-01-20 RX ORDER — BUPIVACAINE HYDROCHLORIDE AND EPINEPHRINE 5; 5 MG/ML; UG/ML
INJECTION, SOLUTION PERINEURAL PRN
Status: DISCONTINUED | OUTPATIENT
Start: 2023-01-20 | End: 2023-01-20 | Stop reason: HOSPADM

## 2023-01-20 RX ORDER — SODIUM CHLORIDE 0.9 % (FLUSH) 0.9 %
5-40 SYRINGE (ML) INJECTION PRN
Status: DISCONTINUED | OUTPATIENT
Start: 2023-01-20 | End: 2023-01-20 | Stop reason: HOSPADM

## 2023-01-20 RX ORDER — LABETALOL HYDROCHLORIDE 5 MG/ML
10 INJECTION, SOLUTION INTRAVENOUS
Status: DISCONTINUED | OUTPATIENT
Start: 2023-01-20 | End: 2023-01-20 | Stop reason: HOSPADM

## 2023-01-20 RX ORDER — KETOROLAC TROMETHAMINE 30 MG/ML
INJECTION, SOLUTION INTRAMUSCULAR; INTRAVENOUS PRN
Status: DISCONTINUED | OUTPATIENT
Start: 2023-01-20 | End: 2023-01-20 | Stop reason: SDUPTHER

## 2023-01-20 RX ORDER — PROPOFOL 10 MG/ML
INJECTION, EMULSION INTRAVENOUS PRN
Status: DISCONTINUED | OUTPATIENT
Start: 2023-01-20 | End: 2023-01-20 | Stop reason: SDUPTHER

## 2023-01-20 RX ORDER — HYDROMORPHONE HCL 110MG/55ML
PATIENT CONTROLLED ANALGESIA SYRINGE INTRAVENOUS PRN
Status: DISCONTINUED | OUTPATIENT
Start: 2023-01-20 | End: 2023-01-20 | Stop reason: SDUPTHER

## 2023-01-20 RX ORDER — ONDANSETRON 2 MG/ML
INJECTION INTRAMUSCULAR; INTRAVENOUS PRN
Status: DISCONTINUED | OUTPATIENT
Start: 2023-01-20 | End: 2023-01-20 | Stop reason: SDUPTHER

## 2023-01-20 RX ADMIN — ROCURONIUM BROMIDE 20 MG: 10 SOLUTION INTRAVENOUS at 08:41

## 2023-01-20 RX ADMIN — PROPOFOL 200 MG: 10 INJECTION, EMULSION INTRAVENOUS at 07:45

## 2023-01-20 RX ADMIN — HYDROCODONE BITARTRATE AND ACETAMINOPHEN 1 TABLET: 5; 325 TABLET ORAL at 10:49

## 2023-01-20 RX ADMIN — SODIUM CHLORIDE, SODIUM LACTATE, POTASSIUM CHLORIDE, AND CALCIUM CHLORIDE: .6; .31; .03; .02 INJECTION, SOLUTION INTRAVENOUS at 07:30

## 2023-01-20 RX ADMIN — KETOROLAC TROMETHAMINE 30 MG: 30 INJECTION, SOLUTION INTRAMUSCULAR; INTRAVENOUS at 08:53

## 2023-01-20 RX ADMIN — HYDROMORPHONE HYDROCHLORIDE 0.5 MG: 0.5 INJECTION, SOLUTION INTRAMUSCULAR; INTRAVENOUS; SUBCUTANEOUS at 10:09

## 2023-01-20 RX ADMIN — DEXAMETHASONE SODIUM PHOSPHATE 4 MG: 10 INJECTION INTRAMUSCULAR; INTRAVENOUS at 08:10

## 2023-01-20 RX ADMIN — LIDOCAINE HYDROCHLORIDE 60 MG: 20 INJECTION, SOLUTION INFILTRATION; PERINEURAL at 07:45

## 2023-01-20 RX ADMIN — Medication 100 MCG: at 08:09

## 2023-01-20 RX ADMIN — Medication 100 MCG: at 08:11

## 2023-01-20 RX ADMIN — ROCURONIUM BROMIDE 70 MG: 10 SOLUTION INTRAVENOUS at 07:45

## 2023-01-20 RX ADMIN — FENTANYL CITRATE 100 MCG: 50 INJECTION INTRAMUSCULAR; INTRAVENOUS at 07:40

## 2023-01-20 RX ADMIN — Medication 3000 MG: at 07:42

## 2023-01-20 RX ADMIN — SODIUM CHLORIDE, SODIUM LACTATE, POTASSIUM CHLORIDE, AND CALCIUM CHLORIDE: .6; .31; .03; .02 INJECTION, SOLUTION INTRAVENOUS at 08:42

## 2023-01-20 RX ADMIN — ONDANSETRON 4 MG: 2 INJECTION INTRAMUSCULAR; INTRAVENOUS at 08:53

## 2023-01-20 RX ADMIN — HYDROMORPHONE HYDROCHLORIDE 1 MG: 2 INJECTION, SOLUTION INTRAMUSCULAR; INTRAVENOUS; SUBCUTANEOUS at 09:10

## 2023-01-20 RX ADMIN — Medication 100 MCG: at 08:07

## 2023-01-20 RX ADMIN — MIDAZOLAM HYDROCHLORIDE 2 MG: 2 INJECTION, SOLUTION INTRAMUSCULAR; INTRAVENOUS at 07:30

## 2023-01-20 RX ADMIN — SUGAMMADEX 200 MG: 100 INJECTION, SOLUTION INTRAVENOUS at 09:07

## 2023-01-20 RX ADMIN — INDOCYANINE GREEN AND WATER 2.5 MG: KIT at 07:27

## 2023-01-20 RX ADMIN — HYDROMORPHONE HYDROCHLORIDE 1 MG: 2 INJECTION, SOLUTION INTRAMUSCULAR; INTRAVENOUS; SUBCUTANEOUS at 09:13

## 2023-01-20 ASSESSMENT — PAIN DESCRIPTION - LOCATION
LOCATION: ABDOMEN
LOCATION: ABDOMEN

## 2023-01-20 ASSESSMENT — PAIN SCALES - GENERAL
PAINLEVEL_OUTOF10: 0
PAINLEVEL_OUTOF10: 6
PAINLEVEL_OUTOF10: 7

## 2023-01-20 NOTE — DISCHARGE INSTRUCTIONS
[facility] GENERAL SURGERY [facility] AND [facility]. Rojas Aguila M.D. [facility] [id number] [place] [address]                [address] Fallon Dotson M.D. [address]         [place], [address] [facility] Coral Hartman M.D                         [phone]        [facility] Annie Banuelos M.D. [facility]       POST-OPERATIVE INSTRUCTIONS FOR GALLBLADDER SURGERY    Call the office to schedule your post-operative appointment with your surgeon for two (2) weeks. You will have either white steri-strips and a water occlusive dressing or surgical glue closing your incisions. If you have clear bandages over your incisions, you may remove them in 3-4 days. Leave the steri-stips in place. These will peel away in 7-10 days. You may shower. Wash incisions gently, and pat them dry. Do not rub your incisions. General guidelines for activity:   Avoid strenuous activity or lifting anything heavier than 20 pounds. It is OK to be up  walking around, and walking up and down stairs. Do what is comfortable: stop and rest when you feel tired. Drink plenty of fluids and stay on a bland diet for 2-3 days after surgery. Do NOT drive while taking your narcotic pain medicine. Watch for signs of infection:  Excessive warmth or bright redness around your incisions  Leakage cloudy fluid from you incisions  Fever over 101.5  During the laparoscopic procedure that you had, gas is pumped into the abdominal cavity. You may feel abdominal, shoulder, or rib pain for a few days due to this gas. You will have pain medicine ordered.  Take as directed    If you experience constipation:  Increase your water intake  Increase your activity, walking is best.  A stool softener or mild laxative may be necessary if you still have not had a bowel movement ; call the office for further instructions. Please take note: IF you do not take all of your narcotic pain medication, we ask that you dispose of these responsibly. Drug drop off boxes are located in the [facility] and [facility] emergency departments. These Med Safe return cabinets are available 24/7 in the [facility]. Hospital of office staff may NOT accept any medications to drop off in the cabinet. [facility] AND [facility]. Milagros Gómez M.D. [address] [facility] [address] [address] [address] [place]                [address] Malathi Palma M[address]D. [address] [place] [address], [address] [place] [address]         [place], [address] [place] [address] Lionel Garduno M.D                         [phone]          [facility] Shadi Stock Che, M.D. [address]    On the following page is a photo similar to your drain: your drain should be compressed at all times, except when physically emptying it. The collapsed drain creates a suction that helps to pull excess fluid out of the wound. Your drain should be emptied 2-3 times a day depending on how much it is draining. Record the date, time, amount and color of fluid collected. IF you see a change in color of your drain, please let the office know. If you have more than one drain, record the output separately for   each drain. Be sure your drains are clearly labeled as #1,#2 or R and L etc., it is important for us to know which drain you are discussing. The amount the drains are putting out helps us know when they may be removed. Watch the area where your drain is inserted as an infection can develop here as well.       It is normal to have some fluid leak from around the drain; you should change the gauze dressing around your drain if it becomes wet. Call your surgeon if there is a lot of drainage from the site, and you have to change the dressing more than once a day. Your drain will be removed in approximately 7-10 days after your surgery. The surgeons medical assistant will tell you when this can be removed. Occasionally the drain may fall out on its own. If this happens, do not panic and call your surgeons office. [facility] Sotero Reyes AND KENNY Nunez. Sridhar Carranza M.D. [address] [facility] [address] [address] [address] [place]                [address] Tiffani Marrero M.D. [address], [address] [place] [address]         [place], [address] [place] [address] Meño Sims M.D                         [phone]          [facility] Shadi Plata M.D. [facility]        HOW TO EMPTY YOUR DRAIN    Pull the stopper out of the top of the drain. Pour the fluid into the container you were provided. After the drain is empty, squeeze the drain flat and push the stopper back into the top of the drain. Remember to record the date, time, amount and color of fluid collected. IF you see a change in color of your drain, please let the office know. RECORD YOUR DRAIN OUTPUT(S) HERE:   DRAIN #1   r R  r L DRAIN # 2  r R  r L    Date/Time Amount  Color Date/Time Amount  Color                                                                                                                    What is a Surgical Site Infection or  (SSI)? A surgical site infection (SSI) is an infection that occurs after surgery in the part of the body where the surgery took place.  Most patients who have surgery do not develop an infection. However, infections can develop in about 1-3 cases for every 100 patients who have had surgery. Our goal is for you to NOT experience any complications and be completely satisfied with your care! However, some signs or symptoms to look for and report immediately to your doctor are:   1. Fever above 101 degrees    2. Redness and increasing pain around the area  where you had surgery   3. Drainage of cloudy fluid or pus coming from the surgical area    Some of the things we/ you can do to prevent SSI's are:   1. Clean hands with soap and water or an alcohol-based hand rub before and after caring for the operative area. This occurs the day of surgery and for the next 2 weeks. 2.Sometimes you receive an appropriate antibiotic within 60 minutes before your surgery or take one for several days after surgery depending on your surgeon's instructions and/or the type of surgery you are having. 3. Family and/or friends who visit you should NOT touch the surgical wound or dressings until advised by your surgeon. 4. Be sure to elevate and decrease the swelling after your surgery to help prevent infection. 5. If you are a diabetic, you need to closely monitor your blood sugar levels and report any significant increases or changes to your surgeon to help promote the healing process.

## 2023-01-20 NOTE — PROGRESS NOTES
Patient's oxygen occasionally dips to 88-89%, quickly returns to normal limits. Incentive spirometry education given.

## 2023-01-20 NOTE — PROGRESS NOTES
Patient admitted to PACU bay 7, VSS. Report received from OR RN and anesthesia. Patient on NC at 4L. LIANNE drain and incisions intact.

## 2023-01-20 NOTE — OP NOTE
Date of Surgery: 1/20/23    Preop Dx: Acute Cholecystitis    Postop Dx: Same    Procedure: Robotic Cholecystectomy    Surgeon: Shady Mccain     Assistant:     Anesthesia: GETA     EBL: <50ml    Specimen: gallbladder    Complications: none    Drains/Lines: none    Indications: 41 yo with history of acute choleystitis    Description:  Patient was given adequate description of the risks and rewards of the procedure, including bleeding, infection, injury to surrounding structures such as the common bile duct, need for further surgery, and possibility of open procedure and freely consented. He was given appropriate antibiotics and brought to the OR where general anesthesia was induced. He was placed in supine position. Prepped and draped in usual sterile fashion. Area above the umbilicus on the left side injected with local anesthetic and #11 blade used to incise epidermis. This allowed passage of 5mm optiview trocar using zero degree laparoscope. Once this was inserted the abdomen was insufflated to 15 mmHg pressure with CO2. Thirty degree laparoscope inserted. Abdomen inspected and no inflammation of gallbladder seen. The left lateral trocar was placed with an 8mm trocar under direct visualization. Another 8mm trocar and a 5mm trocars was inserted in the patient's right side under direct visualization. The supraumbilical trocar was then substituted for a 12mm trocar. Patient was placed in slight reverse trendelenburg position. The robot was then brought into position and connected to the appropriate trocars. The camera was inserted as well as initial instruments. I then went to the robot console. Gallbladder then grasped and retracted over dome of liver. Also grasped at infundibulum and retracted anterolaterally. Cystic duct then circumferentially dissected. During retraction and given the degree of inflammation the cystic duct avulsed during dissection. IC green could be seen coming from its opening.   A clip was placed over the distal duct and no further IC green seen coming out. The cystic artery was then circumferentially dissected with two clips being placed proximal and one distal. It was then divided. Using electrocautery the gallbladder was dissected free of the liver bed, placed into an endoretrieval bag. The liver bed was then inspected and made hemostatic with electrocautery. The area was copiously irrigated with all fluid suctioned off. The cystic duct and artery had clips intact without signs of leak or bleeding. The gallbladder was then removed from the abdomen, and sent off as specimen. Through the 5mm trocar a 15F channel drain was inserted and positioned in the gallbladder fossa. It was secured to the skin with silk suture. The trocars were removed and without bleeding at their insertion sites. The abdomen was allowed to desufflate. The periumbilical trocar site had its fascia closed with 0-0 vicryl figure of eight stitch. All of the trocar sites had the epidermis closed with 4-0 monocryl in the subcuticular plane. Sterile dressing placed. All suture, sponge and instrument count correct times two at end of case. Transferred to PACU in stable condition.    Chanda Browne MD

## 2023-01-20 NOTE — ANESTHESIA PRE PROCEDURE
Department of Anesthesiology  Preprocedure Note       Name:  Leah Sousa   Age:  40 y.o.  :  1985                                          MRN:  [id number]         Date:  2023      Surgeon: Sandro Sierra):  Annie Laguerre MD    Procedure: Procedure(s):  ROBOTIC CHOLECYSTECTOMY WITH INTRAOPATIVE CHOLANGIOGRAM, POSSIBLE OPEN PROCEDURE    Medications prior to admission:   Prior to Admission medications    Medication Sig Start Date End Date Taking? Authorizing Provider   pantoprazole (PROTONIX) 40 MG tablet Take 1 tablet by mouth every morning (before breakfast)  Patient taking differently: Take 40 mg by mouth daily as needed 23  Greg Lozada MD       Current medications:    Current Facility-Administered Medications   Medication Dose Route Frequency Provider Last Rate Last Admin    lidocaine 1 % injection 2 mL  2 mL IntraDERmal Once PRN Jitendra Joshi MD        lactated ringers IV soln infusion   IntraVENous Continuous Jitendra Joshi MD           Allergies: Allergies   Allergen Reactions    Oxycodone Rash       Problem List:    Patient Active Problem List   Diagnosis Code    Moderate obstructive sleep apnea G47.33    Morbid obesity with BMI of 40.0-44.9, adult (HCC) E66.01, Z68.41    Hypertension, essential I10    Chronic GERD K21.9    Gallstones K80.20    Fatty liver K76.0    S/P laparoscopic sleeve gastrectomy Z98.84    Bradycardia R00.1    Severe obesity (BMI 35.0-39. 9) with comorbidity ([facility] [address] E66.01    Acute cholecystitis K81.0    Cholecystitis K81.9       Past Medical History:        Diagnosis Date    GERD (gastroesophageal reflux disease)     Hypertension, essential 2017    Sleep apnea     USES BI-PAP       Past Surgical History:        Procedure Laterality Date    SLEEVE GASTRECTOMY  2017    LAPAROSCOPIC SLEEVE GASTRECTOMY -ETHICON, TRANS-ABDOMINAL PLANE BLOCK    UPPER GASTROINTESTINAL ENDOSCOPY  2017       Social History: Social History     Tobacco Use    Smoking status: Never    Smokeless tobacco: Former   Substance Use Topics    Alcohol use: Yes     Comment: occassional drinker                                Counseling given: Not Answered      Vital Signs (Current):   Vitals:    01/17/23 1452 01/20/23 0620   BP:  (!) 156/101   Pulse:  82   Resp:  16   Temp:  98.2 °F (36.8 °C)   TempSrc:  Temporal   SpO2:  97%   Weight: 300 lb (136.1 kg) 300 lb (136.1 kg)   Height: 6' (1.829 m) 6' (1.829 m)                                              BP Readings from Last 3 Encounters:   01/20/23 (!) 156/101   12/31/22 (!) 144/88   12/27/22 (!) 149/96       NPO Status: Time of last liquid consumption: 2100                        Time of last solid consumption: 2100                        Date of last liquid consumption: 01/19/23                        Date of last solid food consumption: 01/19/23    BMI:   Wt Readings from Last 3 Encounters:   01/20/23 300 lb (136.1 kg)   12/27/22 300 lb (136.1 kg)   12/27/22 300 lb (136.1 kg)     Body mass index is 40.69 kg/m².     CBC:   Lab Results   Component Value Date/Time    WBC 7.8 12/31/2022 05:48 AM    RBC 4.65 12/31/2022 05:48 AM    HGB 12.7 12/31/2022 05:48 AM    HCT 38.8 12/31/2022 05:48 AM    MCV 83.4 12/31/2022 05:48 AM    RDW 14.0 12/31/2022 05:48 AM     12/31/2022 05:48 AM       CMP:   Lab Results   Component Value Date/Time     12/31/2022 05:48 AM    K 4.4 12/31/2022 05:48 AM    K 3.3 12/27/2022 05:17 AM     12/31/2022 05:48 AM    CO2 30 12/31/2022 05:48 AM    BUN 19 12/31/2022 05:48 AM    CREATININE 0.9 12/31/2022 05:48 AM    GFRAA >60 08/09/2018 10:29 AM    AGRATIO 1.0 12/30/2022 06:07 AM    LABGLOM >60 12/31/2022 05:48 AM    GLUCOSE 95 12/31/2022 05:48 AM    PROT 6.7 12/30/2022 06:07 AM    CALCIUM 8.8 12/31/2022 05:48 AM    BILITOT 1.1 12/30/2022 06:07 AM    ALKPHOS 72 12/30/2022 06:07 AM    AST 14 12/30/2022 06:07 AM    ALT 22 12/30/2022 06:07 AM       POC Tests: No results for input(s): POCGLU, POCNA, POCK, POCCL, POCBUN, POCHEMO, POCHCT in the last 72 hours. Coags: No results found for: PROTIME, INR, APTT    HCG (If Applicable): No results found for: PREGTESTUR, PREGSERUM, HCG, HCGQUANT     ABGs: No results found for: PHART, PO2ART, CMF1LPX, QJU0JCW, BEART, X0QAMENW     Type & Screen (If Applicable):  No results found for: LABABO, LABRH    Drug/Infectious Status (If Applicable):  No results found for: HIV, HEPCAB    COVID-19 Screening (If Applicable): No results found for: COVID19        Anesthesia Evaluation    Airway: Mallampati: III  TM distance: >3 FB   Neck ROM: limited  Mouth opening: > = 3 FB   Dental: normal exam         Pulmonary:   (+) sleep apnea:                             Cardiovascular:    (+) hypertension:, dysrhythmias (gene):,                   Neuro/Psych:   Negative Neuro/Psych ROS              GI/Hepatic/Renal:   (+) GERD:, liver disease:,          ROS comment: Cholecystitis, h/o sleeve gastrectomy. Endo/Other:                     Abdominal:             Vascular: negative vascular ROS. Other Findings:           Anesthesia Plan      general     ASA 3     (Pt agrees to risks, benefits and alternatives of GETA. Questions answered. Willing to proceed with plan.)  Induction: intravenous. Anesthetic plan and risks discussed with patient.                         Zca Tsai MD   1/20/2023

## 2023-01-20 NOTE — H&P
I have reviewed the history and physical and examined the patient. I find no relevant changes. I have reviewed with the patient and/or family members, during the preoperative office visit the risks, benefits, and alternatives to the procedure.     Chelle Lucia MD

## 2023-01-20 NOTE — PROGRESS NOTES
Patient admitted to [facility] 4 in preparation for surgery, VSS. Consent confirmed. IV inserted into right hand, LR infusing. Belongings in locker 4, cell phone and glasses given to patient's wife. NPO since 2100.

## 2023-01-20 NOTE — PROGRESS NOTES
Patient meets criteria for discharge per policy. Patient up to the bathroom to void without difficulty. Discharge instructions given to patient and family, verbalized understanding. PIV removed. Patient discharged via wheelchair to the care of their family in stable condition.

## 2023-01-25 ENCOUNTER — OFFICE VISIT (OUTPATIENT)
Dept: SURGERY | Age: 38
End: 2023-01-25

## 2023-01-25 VITALS
HEART RATE: 119 BPM | HEIGHT: 72 IN | SYSTOLIC BLOOD PRESSURE: 155 MMHG | BODY MASS INDEX: 42.66 KG/M2 | DIASTOLIC BLOOD PRESSURE: 100 MMHG | WEIGHT: 315 LBS

## 2023-01-25 DIAGNOSIS — Z90.49 S/P LAPAROSCOPIC CHOLECYSTECTOMY: Primary | ICD-10-CM

## 2023-01-25 PROCEDURE — 99024 POSTOP FOLLOW-UP VISIT: CPT | Performed by: SURGERY

## 2023-02-01 NOTE — PROGRESS NOTES
HPI: Nursing notes reviewed. Patient is a 44 yo who underwent robotic cholecystectomy at [facility].   Reports minimal pain and no nausea. Energy is normal.  no diarrhea and no constipation. Minimal outpu tfrom drain    ROS:  10 point review of systems performed with pertinent positives in HPI    Phys:    Abd - soft, minimal tender, nondistended, john serosanguious   Incisions - no drainage    Assesment: 44 yo s/p robotic cholecystectomy     Plan: 1. Doing well postop with minimal pain and no nausea   2. No activity limitations   3. Low fat diet, john removed   4.   Call with concerns

## 2024-06-07 ENCOUNTER — OFFICE VISIT (OUTPATIENT)
Dept: FAMILY MEDICINE CLINIC | Age: 39
End: 2024-06-07
Payer: COMMERCIAL

## 2024-06-07 VITALS
DIASTOLIC BLOOD PRESSURE: 98 MMHG | WEIGHT: 315 LBS | BODY MASS INDEX: 42.66 KG/M2 | OXYGEN SATURATION: 98 % | HEIGHT: 72 IN | HEART RATE: 72 BPM | SYSTOLIC BLOOD PRESSURE: 145 MMHG

## 2024-06-07 DIAGNOSIS — I10 HYPERTENSION, ESSENTIAL: Primary | ICD-10-CM

## 2024-06-07 DIAGNOSIS — Z98.84 S/P LAPAROSCOPIC SLEEVE GASTRECTOMY: ICD-10-CM

## 2024-06-07 DIAGNOSIS — I10 HYPERTENSION, ESSENTIAL: ICD-10-CM

## 2024-06-07 DIAGNOSIS — E66.01 CLASS 3 SEVERE OBESITY WITH SERIOUS COMORBIDITY AND BODY MASS INDEX (BMI) OF 45.0 TO 49.9 IN ADULT, UNSPECIFIED OBESITY TYPE (HCC): ICD-10-CM

## 2024-06-07 DIAGNOSIS — G47.33 MODERATE OBSTRUCTIVE SLEEP APNEA: ICD-10-CM

## 2024-06-07 PROBLEM — E66.813 CLASS 3 SEVERE OBESITY WITH SERIOUS COMORBIDITY AND BODY MASS INDEX (BMI) OF 45.0 TO 49.9 IN ADULT: Status: ACTIVE | Noted: 2019-06-20

## 2024-06-07 LAB
BASOPHILS # BLD: 0 K/UL (ref 0–0.2)
BASOPHILS NFR BLD: 0.4 %
DEPRECATED RDW RBC AUTO: 13.9 % (ref 12.4–15.4)
EOSINOPHIL # BLD: 0.2 K/UL (ref 0–0.6)
EOSINOPHIL NFR BLD: 2.3 %
HCT VFR BLD AUTO: 39.6 % (ref 40.5–52.5)
HGB BLD-MCNC: 13.5 G/DL (ref 13.5–17.5)
LYMPHOCYTES # BLD: 2 K/UL (ref 1–5.1)
LYMPHOCYTES NFR BLD: 29 %
MCH RBC QN AUTO: 28.1 PG (ref 26–34)
MCHC RBC AUTO-ENTMCNC: 34.1 G/DL (ref 31–36)
MCV RBC AUTO: 82.5 FL (ref 80–100)
MONOCYTES # BLD: 0.5 K/UL (ref 0–1.3)
MONOCYTES NFR BLD: 7.3 %
NEUTROPHILS # BLD: 4.2 K/UL (ref 1.7–7.7)
NEUTROPHILS NFR BLD: 61 %
PLATELET # BLD AUTO: 290 K/UL (ref 135–450)
PMV BLD AUTO: 9 FL (ref 5–10.5)
RBC # BLD AUTO: 4.8 M/UL (ref 4.2–5.9)
WBC # BLD AUTO: 6.9 K/UL (ref 4–11)

## 2024-06-07 PROCEDURE — 99204 OFFICE O/P NEW MOD 45 MIN: CPT | Performed by: SURGERY

## 2024-06-07 PROCEDURE — 3080F DIAST BP >= 90 MM HG: CPT | Performed by: SURGERY

## 2024-06-07 PROCEDURE — 1036F TOBACCO NON-USER: CPT | Performed by: SURGERY

## 2024-06-07 PROCEDURE — 3077F SYST BP >= 140 MM HG: CPT | Performed by: SURGERY

## 2024-06-07 PROCEDURE — G8417 CALC BMI ABV UP PARAM F/U: HCPCS | Performed by: SURGERY

## 2024-06-07 PROCEDURE — G8427 DOCREV CUR MEDS BY ELIG CLIN: HCPCS | Performed by: SURGERY

## 2024-06-07 RX ORDER — ASPIRIN 81 MG/1
81 TABLET, CHEWABLE ORAL DAILY
COMMUNITY

## 2024-06-07 RX ORDER — LOSARTAN POTASSIUM 50 MG/1
50 TABLET ORAL DAILY
Qty: 90 TABLET | Refills: 1 | Status: SHIPPED | OUTPATIENT
Start: 2024-06-07

## 2024-06-07 SDOH — ECONOMIC STABILITY: FOOD INSECURITY: WITHIN THE PAST 12 MONTHS, YOU WORRIED THAT YOUR FOOD WOULD RUN OUT BEFORE YOU GOT MONEY TO BUY MORE.: NEVER TRUE

## 2024-06-07 SDOH — ECONOMIC STABILITY: FOOD INSECURITY: WITHIN THE PAST 12 MONTHS, THE FOOD YOU BOUGHT JUST DIDN'T LAST AND YOU DIDN'T HAVE MONEY TO GET MORE.: NEVER TRUE

## 2024-06-07 SDOH — ECONOMIC STABILITY: HOUSING INSECURITY
IN THE LAST 12 MONTHS, WAS THERE A TIME WHEN YOU DID NOT HAVE A STEADY PLACE TO SLEEP OR SLEPT IN A SHELTER (INCLUDING NOW)?: NO

## 2024-06-07 SDOH — ECONOMIC STABILITY: INCOME INSECURITY: HOW HARD IS IT FOR YOU TO PAY FOR THE VERY BASICS LIKE FOOD, HOUSING, MEDICAL CARE, AND HEATING?: NOT VERY HARD

## 2024-06-07 SDOH — ECONOMIC STABILITY: TRANSPORTATION INSECURITY
IN THE PAST 12 MONTHS, HAS LACK OF TRANSPORTATION KEPT YOU FROM MEETINGS, WORK, OR FROM GETTING THINGS NEEDED FOR DAILY LIVING?: NO

## 2024-06-07 SDOH — ECONOMIC STABILITY: INCOME INSECURITY: HOW HARD IS IT FOR YOU TO PAY FOR THE VERY BASICS LIKE FOOD, HOUSING, MEDICAL CARE, AND HEATING?: NOT HARD AT ALL

## 2024-06-07 ASSESSMENT — ENCOUNTER SYMPTOMS
NAUSEA: 0
COUGH: 0
SINUS PRESSURE: 0
ABDOMINAL PAIN: 0
SINUS PAIN: 0
DIARRHEA: 0
CONSTIPATION: 0

## 2024-06-07 ASSESSMENT — PATIENT HEALTH QUESTIONNAIRE - PHQ9
SUM OF ALL RESPONSES TO PHQ QUESTIONS 1-9: 0
SUM OF ALL RESPONSES TO PHQ QUESTIONS 1-9: 0
SUM OF ALL RESPONSES TO PHQ9 QUESTIONS 1 & 2: 0
SUM OF ALL RESPONSES TO PHQ QUESTIONS 1-9: 0
SUM OF ALL RESPONSES TO PHQ QUESTIONS 1-9: 0
1. LITTLE INTEREST OR PLEASURE IN DOING THINGS: NOT AT ALL
SUM OF ALL RESPONSES TO PHQ9 QUESTIONS 1 & 2: 0
2. FEELING DOWN, DEPRESSED OR HOPELESS: NOT AT ALL
2. FEELING DOWN, DEPRESSED OR HOPELESS: NOT AT ALL
1. LITTLE INTEREST OR PLEASURE IN DOING THINGS: NOT AT ALL

## 2024-06-07 NOTE — PROGRESS NOTES
6/7/2024    This is a 39 y.o. male   Chief Complaint   Patient presents with    Hypertension     Lighthead and spots in the eye, headache has been checking the bp at home    Leg Swelling     Lower calf swelling    .    B LE swelling by the end of the day, improves overnight. 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 Notices the swelling when he gets home and takes his boots off. Discussed water intake, watching salt intake, and compression.     Edema  This is a recurrent problem. The current episode started more than 1 month ago. The problem occurs daily. The problem has been unchanged. Associated symptoms include headaches. Pertinent negatives include no abdominal pain, chest pain, chills, congestion, coughing, diaphoresis, fatigue, fever, nausea, numbness or weakness. The symptoms are aggravated by standing and walking. He has tried sleep and lying down for the symptoms. The treatment provided significant relief.        Patient Active Problem List   Diagnosis    Moderate obstructive sleep apnea    Morbid obesity with BMI of 40.0-44.9, adult (HCC)    Hypertension, essential    Chronic GERD    Gallstones    Fatty liver    S/P laparoscopic sleeve gastrectomy    Bradycardia    Class 3 severe obesity with serious comorbidity and body mass index (BMI) of 45.0 to 49.9 in adult (HCC)    Acute cholecystitis    Cholecystitis, unspecified       Current Outpatient Medications   Medication Sig Dispense Refill    aspirin 81 MG chewable tablet Take 1 tablet by mouth daily      losartan (COZAAR) 50 MG tablet Take 1 tablet by mouth daily 90 tablet 1     No current facility-administered medications for this visit.       Allergies   Allergen Reactions    Oxycodone Rash       Social History     Tobacco Use    Smoking status: Never    Smokeless tobacco: Former   Substance Use Topics    Alcohol use: Yes     Alcohol/week: 1.0 standard drink of alcohol     Types: 1 Cans of beer per week     Comment: Not a frequent drinker,

## 2024-06-08 LAB
ALBUMIN SERPL-MCNC: 4.4 G/DL (ref 3.4–5)
ALBUMIN/GLOB SERPL: 1.4 {RATIO} (ref 1.1–2.2)
ALP SERPL-CCNC: 87 U/L (ref 40–129)
ALT SERPL-CCNC: 33 U/L (ref 10–40)
ANION GAP SERPL CALCULATED.3IONS-SCNC: 12 MMOL/L (ref 3–16)
AST SERPL-CCNC: 27 U/L (ref 15–37)
BILIRUB SERPL-MCNC: 0.3 MG/DL (ref 0–1)
BUN SERPL-MCNC: 16 MG/DL (ref 7–20)
CALCIUM SERPL-MCNC: 9.7 MG/DL (ref 8.3–10.6)
CHLORIDE SERPL-SCNC: 103 MMOL/L (ref 99–110)
CO2 SERPL-SCNC: 25 MMOL/L (ref 21–32)
CREAT SERPL-MCNC: 0.8 MG/DL (ref 0.9–1.3)
GFR SERPLBLD CREATININE-BSD FMLA CKD-EPI: >90 ML/MIN/{1.73_M2}
GLUCOSE SERPL-MCNC: 83 MG/DL (ref 70–99)
HDLC SERPL-MCNC: 64 MG/DL (ref 40–60)
LDLC SERPL CALC-MCNC: 100 MG/DL
POTASSIUM SERPL-SCNC: 4.5 MMOL/L (ref 3.5–5.1)
PROT SERPL-MCNC: 7.5 G/DL (ref 6.4–8.2)
SODIUM SERPL-SCNC: 140 MMOL/L (ref 136–145)
TRIGL SERPL-MCNC: 68 MG/DL (ref 0–150)
VLDLC SERPL CALC-MCNC: 14 MG/DL

## 2024-08-27 ENCOUNTER — OFFICE VISIT (OUTPATIENT)
Dept: FAMILY MEDICINE CLINIC | Age: 39
End: 2024-08-27
Payer: COMMERCIAL

## 2024-08-27 VITALS
SYSTOLIC BLOOD PRESSURE: 149 MMHG | WEIGHT: 315 LBS | DIASTOLIC BLOOD PRESSURE: 90 MMHG | TEMPERATURE: 98.9 F | BODY MASS INDEX: 45.03 KG/M2 | RESPIRATION RATE: 16 BRPM | HEART RATE: 67 BPM

## 2024-08-27 DIAGNOSIS — I10 HYPERTENSION, ESSENTIAL: Primary | ICD-10-CM

## 2024-08-27 PROCEDURE — 3077F SYST BP >= 140 MM HG: CPT | Performed by: FAMILY MEDICINE

## 2024-08-27 PROCEDURE — 3080F DIAST BP >= 90 MM HG: CPT | Performed by: FAMILY MEDICINE

## 2024-08-27 PROCEDURE — 1036F TOBACCO NON-USER: CPT | Performed by: FAMILY MEDICINE

## 2024-08-27 PROCEDURE — 99213 OFFICE O/P EST LOW 20 MIN: CPT | Performed by: FAMILY MEDICINE

## 2024-08-27 PROCEDURE — G8417 CALC BMI ABV UP PARAM F/U: HCPCS | Performed by: FAMILY MEDICINE

## 2024-08-27 PROCEDURE — G8427 DOCREV CUR MEDS BY ELIG CLIN: HCPCS | Performed by: FAMILY MEDICINE

## 2024-08-27 RX ORDER — CETIRIZINE HYDROCHLORIDE 10 MG/1
10 TABLET ORAL DAILY
COMMUNITY

## 2024-08-27 RX ORDER — LOSARTAN POTASSIUM 100 MG/1
100 TABLET ORAL DAILY
Qty: 90 TABLET | Refills: 1 | Status: CANCELLED | OUTPATIENT
Start: 2024-08-27

## 2024-08-27 ASSESSMENT — PATIENT HEALTH QUESTIONNAIRE - PHQ9
SUM OF ALL RESPONSES TO PHQ9 QUESTIONS 1 & 2: 0
1. LITTLE INTEREST OR PLEASURE IN DOING THINGS: NOT AT ALL
SUM OF ALL RESPONSES TO PHQ QUESTIONS 1-9: 0
SUM OF ALL RESPONSES TO PHQ QUESTIONS 1-9: 0
2. FEELING DOWN, DEPRESSED OR HOPELESS: NOT AT ALL
SUM OF ALL RESPONSES TO PHQ QUESTIONS 1-9: 0
SUM OF ALL RESPONSES TO PHQ QUESTIONS 1-9: 0

## 2024-08-27 NOTE — PROGRESS NOTES
06/07/2024 87  40 - 129 U/L Final    ALT 06/07/2024 33  10 - 40 U/L Final    AST 06/07/2024 27  15 - 37 U/L Final    WBC 06/07/2024 6.9  4.0 - 11.0 K/uL Final    RBC 06/07/2024 4.80  4.20 - 5.90 M/uL Final    Hemoglobin 06/07/2024 13.5  13.5 - 17.5 g/dL Final    Hematocrit 06/07/2024 39.6 (L)  40.5 - 52.5 % Final    MCV 06/07/2024 82.5  80.0 - 100.0 fL Final    MCH 06/07/2024 28.1  26.0 - 34.0 pg Final    MCHC 06/07/2024 34.1  31.0 - 36.0 g/dL Final    RDW 06/07/2024 13.9  12.4 - 15.4 % Final    Platelets 06/07/2024 290  135 - 450 K/uL Final    MPV 06/07/2024 9.0  5.0 - 10.5 fL Final    Neutrophils % 06/07/2024 61.0  % Final    Lymphocytes % 06/07/2024 29.0  % Final    Monocytes % 06/07/2024 7.3  % Final    Eosinophils % 06/07/2024 2.3  % Final    Basophils % 06/07/2024 0.4  % Final    Neutrophils Absolute 06/07/2024 4.2  1.7 - 7.7 K/uL Final    Lymphocytes Absolute 06/07/2024 2.0  1.0 - 5.1 K/uL Final    Monocytes Absolute 06/07/2024 0.5  0.0 - 1.3 K/uL Final    Eosinophils Absolute 06/07/2024 0.2  0.0 - 0.6 K/uL Final    Basophils Absolute 06/07/2024 0.0  0.0 - 0.2 K/uL Final    Cholesterol, Total 06/07/2024 178  0 - 199 mg/dL Final    Triglycerides 06/07/2024 68  0 - 150 mg/dL Final    HDL 06/07/2024 64 (H)  40 - 60 mg/dL Final    Comment: An HDL cholesterol less than 40 mg/dL is low and  constitutes a coronary heart disease risk factor.  An HDL cholesterol greater than 60 mg/dL is a  negative risk factor for coronary heart disease.      LDL Cholesterol 06/07/2024 100 (H)  <100 mg/dL Final    VLDL Cholesterol Calculated 06/07/2024 14  Not Established mg/dL Final       Review of Systems   Neurological:  Negative for dizziness and light-headedness.       BP (!) 149/90 (Site: Right Lower Arm, Position: Sitting, Cuff Size: Large Adult)   Pulse 67   Temp 98.9 °F (37.2 °C) (Temporal)   Resp 16   Wt (!) 150.6 kg (332 lb)   BMI 45.03 kg/m²     Physical Exam  Vitals reviewed.   Constitutional:       Appearance: He is

## 2024-11-07 ENCOUNTER — PATIENT MESSAGE (OUTPATIENT)
Dept: FAMILY MEDICINE CLINIC | Age: 39
End: 2024-11-07

## 2024-11-07 DIAGNOSIS — I10 HYPERTENSION, ESSENTIAL: ICD-10-CM

## 2024-11-07 RX ORDER — LOSARTAN POTASSIUM 50 MG/1
50 TABLET ORAL DAILY
Qty: 90 TABLET | Refills: 1 | Status: SHIPPED | OUTPATIENT
Start: 2024-11-07 | End: 2024-11-08

## 2024-11-08 DIAGNOSIS — I10 HYPERTENSION, ESSENTIAL: ICD-10-CM

## 2024-11-08 RX ORDER — LOSARTAN POTASSIUM 50 MG/1
75 TABLET ORAL DAILY
Qty: 90 TABLET | Refills: 0 | Status: SHIPPED | OUTPATIENT
Start: 2024-11-08

## 2024-12-03 ENCOUNTER — OFFICE VISIT (OUTPATIENT)
Dept: FAMILY MEDICINE CLINIC | Age: 39
End: 2024-12-03
Payer: COMMERCIAL

## 2024-12-03 VITALS
HEIGHT: 72 IN | DIASTOLIC BLOOD PRESSURE: 84 MMHG | OXYGEN SATURATION: 98 % | HEART RATE: 80 BPM | TEMPERATURE: 97.5 F | BODY MASS INDEX: 42.66 KG/M2 | SYSTOLIC BLOOD PRESSURE: 132 MMHG | WEIGHT: 315 LBS

## 2024-12-03 DIAGNOSIS — Z00.00 ROUTINE PHYSICAL EXAMINATION: Primary | ICD-10-CM

## 2024-12-03 DIAGNOSIS — E66.813 CLASS 3 SEVERE OBESITY WITH SERIOUS COMORBIDITY AND BODY MASS INDEX (BMI) OF 45.0 TO 49.9 IN ADULT, UNSPECIFIED OBESITY TYPE: ICD-10-CM

## 2024-12-03 DIAGNOSIS — E66.01 CLASS 3 SEVERE OBESITY WITH SERIOUS COMORBIDITY AND BODY MASS INDEX (BMI) OF 45.0 TO 49.9 IN ADULT, UNSPECIFIED OBESITY TYPE: ICD-10-CM

## 2024-12-03 DIAGNOSIS — I10 HYPERTENSION, ESSENTIAL: ICD-10-CM

## 2024-12-03 PROCEDURE — G8484 FLU IMMUNIZE NO ADMIN: HCPCS | Performed by: SURGERY

## 2024-12-03 PROCEDURE — 99395 PREV VISIT EST AGE 18-39: CPT | Performed by: SURGERY

## 2024-12-03 PROCEDURE — 3079F DIAST BP 80-89 MM HG: CPT | Performed by: SURGERY

## 2024-12-03 PROCEDURE — 3075F SYST BP GE 130 - 139MM HG: CPT | Performed by: SURGERY

## 2024-12-03 RX ORDER — LOSARTAN POTASSIUM 50 MG/1
75 TABLET ORAL DAILY
Qty: 135 TABLET | Refills: 3 | Status: SHIPPED | OUTPATIENT
Start: 2024-12-03

## 2024-12-03 SDOH — ECONOMIC STABILITY: INCOME INSECURITY: HOW HARD IS IT FOR YOU TO PAY FOR THE VERY BASICS LIKE FOOD, HOUSING, MEDICAL CARE, AND HEATING?: NOT HARD AT ALL

## 2024-12-03 SDOH — ECONOMIC STABILITY: FOOD INSECURITY: WITHIN THE PAST 12 MONTHS, THE FOOD YOU BOUGHT JUST DIDN'T LAST AND YOU DIDN'T HAVE MONEY TO GET MORE.: NEVER TRUE

## 2024-12-03 SDOH — ECONOMIC STABILITY: FOOD INSECURITY: WITHIN THE PAST 12 MONTHS, YOU WORRIED THAT YOUR FOOD WOULD RUN OUT BEFORE YOU GOT MONEY TO BUY MORE.: NEVER TRUE

## 2024-12-03 SDOH — ECONOMIC STABILITY: INCOME INSECURITY: HOW HARD IS IT FOR YOU TO PAY FOR THE VERY BASICS LIKE FOOD, HOUSING, MEDICAL CARE, AND HEATING?: NOT VERY HARD

## 2024-12-03 ASSESSMENT — PATIENT HEALTH QUESTIONNAIRE - PHQ9
SUM OF ALL RESPONSES TO PHQ9 QUESTIONS 1 & 2: 0
2. FEELING DOWN, DEPRESSED OR HOPELESS: NOT AT ALL
1. LITTLE INTEREST OR PLEASURE IN DOING THINGS: NOT AT ALL
SUM OF ALL RESPONSES TO PHQ QUESTIONS 1-9: 0
1. LITTLE INTEREST OR PLEASURE IN DOING THINGS: NOT AT ALL
SUM OF ALL RESPONSES TO PHQ QUESTIONS 1-9: 0
SUM OF ALL RESPONSES TO PHQ QUESTIONS 1-9: 0
2. FEELING DOWN, DEPRESSED OR HOPELESS: NOT AT ALL
SUM OF ALL RESPONSES TO PHQ9 QUESTIONS 1 & 2: 0
SUM OF ALL RESPONSES TO PHQ QUESTIONS 1-9: 0

## 2024-12-03 ASSESSMENT — ENCOUNTER SYMPTOMS
COUGH: 0
ABDOMINAL PAIN: 0
NAUSEA: 0
DIARRHEA: 0
SHORTNESS OF BREATH: 0
CONSTIPATION: 0
SORE THROAT: 0

## 2024-12-03 ASSESSMENT — ANXIETY QUESTIONNAIRES
2. NOT BEING ABLE TO STOP OR CONTROL WORRYING: NOT AT ALL
6. BECOMING EASILY ANNOYED OR IRRITABLE: NOT AT ALL
4. TROUBLE RELAXING: NOT AT ALL
2. NOT BEING ABLE TO STOP OR CONTROL WORRYING: NOT AT ALL
7. FEELING AFRAID AS IF SOMETHING AWFUL MIGHT HAPPEN: NOT AT ALL
IF YOU CHECKED OFF ANY PROBLEMS ON THIS QUESTIONNAIRE, HOW DIFFICULT HAVE THESE PROBLEMS MADE IT FOR YOU TO DO YOUR WORK, TAKE CARE OF THINGS AT HOME, OR GET ALONG WITH OTHER PEOPLE: NOT DIFFICULT AT ALL
GAD7 TOTAL SCORE: 0
3. WORRYING TOO MUCH ABOUT DIFFERENT THINGS: NOT AT ALL
4. TROUBLE RELAXING: NOT AT ALL
6. BECOMING EASILY ANNOYED OR IRRITABLE: NOT AT ALL
IF YOU CHECKED OFF ANY PROBLEMS ON THIS QUESTIONNAIRE, HOW DIFFICULT HAVE THESE PROBLEMS MADE IT FOR YOU TO DO YOUR WORK, TAKE CARE OF THINGS AT HOME, OR GET ALONG WITH OTHER PEOPLE: NOT DIFFICULT AT ALL
5. BEING SO RESTLESS THAT IT IS HARD TO SIT STILL: NOT AT ALL
5. BEING SO RESTLESS THAT IT IS HARD TO SIT STILL: NOT AT ALL
7. FEELING AFRAID AS IF SOMETHING AWFUL MIGHT HAPPEN: NOT AT ALL
3. WORRYING TOO MUCH ABOUT DIFFERENT THINGS: NOT AT ALL
1. FEELING NERVOUS, ANXIOUS, OR ON EDGE: NOT AT ALL
1. FEELING NERVOUS, ANXIOUS, OR ON EDGE: NOT AT ALL

## 2024-12-03 NOTE — PROGRESS NOTES
Pressure Paternal Grandfather     High Cholesterol Paternal Grandfather        ADVANCE DIRECTIVE: N, <no information>    Vitals:    12/03/24 1537   BP: 132/84   Pulse: 80   Temp: 97.5 °F (36.4 °C)   SpO2: 98%   Weight: (!) 155.1 kg (342 lb)   Height: 1.829 m (6')     Estimated body mass index is 46.38 kg/m² as calculated from the following:    Height as of this encounter: 1.829 m (6').    Weight as of this encounter: 155.1 kg (342 lb).       Objective   Physical Exam  Constitutional:       Appearance: Normal appearance.   HENT:      Nose: No rhinorrhea.   Eyes:      General: No scleral icterus.     Extraocular Movements: Extraocular movements intact.   Cardiovascular:      Rate and Rhythm: Normal rate and regular rhythm.      Pulses: Normal pulses.      Heart sounds: Normal heart sounds. No murmur heard.  Pulmonary:      Effort: Pulmonary effort is normal.      Breath sounds: Normal breath sounds.   Abdominal:      General: Bowel sounds are normal.      Palpations: Abdomen is soft.      Tenderness: There is no abdominal tenderness.   Musculoskeletal:         General: Normal range of motion.   Skin:     General: Skin is warm and dry.      Capillary Refill: Capillary refill takes less than 2 seconds.      Findings: No rash.   Neurological:      Mental Status: He is alert and oriented to person, place, and time.   Psychiatric:         Mood and Affect: Mood normal.             Latest Ref Rng & Units 6/7/2024     3:43 PM 12/31/2022     5:48 AM 12/30/2022     6:07 AM   LAB [facility]   GLU random 70 - 99 mg/dL 83  95  126    CHOL 0 - 199 mg/dL 178      TRIG 0 - 150 mg/dL 68      HDL 40 - 60 mg/dL 64      LDL CALC <100 mg/dL 100       - 145 mmol/L 140  139  136    K 3.5 - 5.1 mmol/L 4.5  4.4  4.1    BUN 7 - 20 mg/dL 16  19  20    CR 0.9 - 1.3 mg/dL 0.8  0.9  0.7    GFR >60 >90  >60  >60    CA 8.3 - 10.6 mg/dL 9.7  8.8  8.7    ALT 10 - 40 U/L 33   22    AST 15 - 37 U/L 27   14    HGB 13.5 - 17.5 g/dL 13.5  12.7

## 2024-12-03 NOTE — ASSESSMENT & PLAN NOTE
- well controlled at 75 mg losartan daily    Orders:    losartan (COZAAR) 50 MG tablet; Take 1.5 tablets by mouth daily

## 2024-12-03 NOTE — ASSESSMENT & PLAN NOTE
Chronic, not at goal (unstable), discussed exercise goals as well as increasing lean muscle mass and lifestyle modifications recommended

## 2025-02-24 ENCOUNTER — OFFICE VISIT (OUTPATIENT)
Dept: FAMILY MEDICINE CLINIC | Age: 40
End: 2025-02-24
Payer: COMMERCIAL

## 2025-02-24 ENCOUNTER — TRANSCRIBE ORDERS (OUTPATIENT)
Dept: ADMINISTRATIVE | Age: 40
End: 2025-02-24

## 2025-02-24 VITALS
HEIGHT: 72 IN | BODY MASS INDEX: 42.66 KG/M2 | WEIGHT: 315 LBS | RESPIRATION RATE: 16 BRPM | OXYGEN SATURATION: 96 % | DIASTOLIC BLOOD PRESSURE: 98 MMHG | SYSTOLIC BLOOD PRESSURE: 150 MMHG | HEART RATE: 111 BPM | TEMPERATURE: 97 F

## 2025-02-24 DIAGNOSIS — I80.01 SUPERFICIAL THROMBOPHLEBITIS OF RIGHT LEG: Primary | ICD-10-CM

## 2025-02-24 DIAGNOSIS — I80.01 THROMBOPHLEBITIS OF SUPERFICIAL VEINS OF RIGHT LOWER EXTREMITY: Primary | ICD-10-CM

## 2025-02-24 PROCEDURE — 3077F SYST BP >= 140 MM HG: CPT

## 2025-02-24 PROCEDURE — G8427 DOCREV CUR MEDS BY ELIG CLIN: HCPCS

## 2025-02-24 PROCEDURE — 3080F DIAST BP >= 90 MM HG: CPT

## 2025-02-24 PROCEDURE — 99214 OFFICE O/P EST MOD 30 MIN: CPT

## 2025-02-24 PROCEDURE — 1036F TOBACCO NON-USER: CPT

## 2025-02-24 PROCEDURE — G8417 CALC BMI ABV UP PARAM F/U: HCPCS

## 2025-02-24 SDOH — ECONOMIC STABILITY: FOOD INSECURITY: WITHIN THE PAST 12 MONTHS, THE FOOD YOU BOUGHT JUST DIDN'T LAST AND YOU DIDN'T HAVE MONEY TO GET MORE.: NEVER TRUE

## 2025-02-24 SDOH — ECONOMIC STABILITY: FOOD INSECURITY: WITHIN THE PAST 12 MONTHS, YOU WORRIED THAT YOUR FOOD WOULD RUN OUT BEFORE YOU GOT MONEY TO BUY MORE.: NEVER TRUE

## 2025-02-24 ASSESSMENT — PATIENT HEALTH QUESTIONNAIRE - PHQ9
1. LITTLE INTEREST OR PLEASURE IN DOING THINGS: NOT AT ALL
SUM OF ALL RESPONSES TO PHQ QUESTIONS 1-9: 0
SUM OF ALL RESPONSES TO PHQ QUESTIONS 1-9: 0
SUM OF ALL RESPONSES TO PHQ9 QUESTIONS 1 & 2: 0
DEPRESSION UNABLE TO ASSESS: FUNCTIONAL CAPACITY MOTIVATION LIMITS ACCURACY
SUM OF ALL RESPONSES TO PHQ QUESTIONS 1-9: 0
SUM OF ALL RESPONSES TO PHQ QUESTIONS 1-9: 0
2. FEELING DOWN, DEPRESSED OR HOPELESS: NOT AT ALL

## 2025-02-24 ASSESSMENT — ENCOUNTER SYMPTOMS
SHORTNESS OF BREATH: 0
COUGH: 0

## 2025-02-24 ASSESSMENT — ANXIETY QUESTIONNAIRES
1. FEELING NERVOUS, ANXIOUS, OR ON EDGE: NOT AT ALL
7. FEELING AFRAID AS IF SOMETHING AWFUL MIGHT HAPPEN: NOT AT ALL
GAD7 TOTAL SCORE: 0
5. BEING SO RESTLESS THAT IT IS HARD TO SIT STILL: NOT AT ALL
2. NOT BEING ABLE TO STOP OR CONTROL WORRYING: NOT AT ALL
4. TROUBLE RELAXING: NOT AT ALL
3. WORRYING TOO MUCH ABOUT DIFFERENT THINGS: NOT AT ALL
6. BECOMING EASILY ANNOYED OR IRRITABLE: NOT AT ALL
IF YOU CHECKED OFF ANY PROBLEMS ON THIS QUESTIONNAIRE, HOW DIFFICULT HAVE THESE PROBLEMS MADE IT FOR YOU TO DO YOUR WORK, TAKE CARE OF THINGS AT HOME, OR GET ALONG WITH OTHER PEOPLE: NOT DIFFICULT AT ALL

## 2025-02-24 NOTE — PROGRESS NOTES
2/24/25    Chief Complaint   Patient presents with    Leg Swelling     Right knee swelling        HPI: Vicente Valladares is a 40 y.o. male who presents for swelling on medial side of R knee. 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 Has been sore since last Thursday. Is red and hard on side of knee. Pt has a hx of varicose veins. Never had a blood clot in leg before. No pain with walking. Area is tender to touch. No SOB.     Past Medical History:   Diagnosis Date    GERD (gastroesophageal reflux disease)     Hypertension, essential 04/20/2017    Sleep apnea     USES BI-PAP     Past Surgical History:   Procedure Laterality Date    CHOLECYSTECTOMY, LAPAROSCOPIC N/A 1/20/2023    ROBOTIC CHOLECYSTECTOMY performed by Atul Novak MD at [facility] OR    SLEEVE GASTRECTOMY  11/13/2017    LAPAROSCOPIC SLEEVE GASTRECTOMY -ETHICON, TRANS-ABDOMINAL PLANE BLOCK    UPPER GASTROINTESTINAL ENDOSCOPY  07/28/2017     Social History     Tobacco Use    Smoking status: Never    Smokeless tobacco: Former   Vaping Use    Vaping status: Never Used   Substance Use Topics    Alcohol use: Yes     Alcohol/week: 1.0 standard drink of alcohol     Types: 1 Cans of beer per week     Comment: Not a frequent drinker, more at social gatherings 4-6 times    Drug use: No       Family History   Problem Relation Age of Onset    COPD Mother     Arthritis Mother     Asthma Mother     Rheum Arthritis Mother     High Blood Pressure Father     High Cholesterol Father     High Blood Pressure Paternal Uncle     High Cholesterol Paternal Uncle     Vision Loss Paternal Uncle     High Cholesterol Paternal Grandmother     Clotting Disorder Paternal Grandmother     Stroke Paternal Grandmother     High Blood Pressure Paternal Grandfather     High Cholesterol Paternal Grandfather      Review of Systems   Constitutional:  Negative for chills and fever.   Respiratory:  Negative for cough and shortness of breath.    Cardiovascular:  Negative for chest pain and

## 2025-02-28 ENCOUNTER — TELEPHONE (OUTPATIENT)
Dept: FAMILY MEDICINE CLINIC | Age: 40
End: 2025-02-28

## 2025-02-28 ENCOUNTER — HOSPITAL ENCOUNTER (OUTPATIENT)
Dept: VASCULAR LAB | Age: 40
Discharge: HOME | End: 2025-02-28
Payer: COMMERCIAL

## 2025-02-28 DIAGNOSIS — I80.01 SUPERFICIAL THROMBOPHLEBITIS OF RIGHT LEG: ICD-10-CM

## 2025-02-28 PROCEDURE — 93971 EXTREMITY STUDY: CPT

## 2025-02-28 NOTE — TELEPHONE ENCOUNTER
Torsten from [facility] calling in to give results found SVT in the right knee and varicose veins and GSV. No DBT.

## 2025-03-04 DIAGNOSIS — I82.5Y1 CHRONIC DEEP VEIN THROMBOSIS (DVT) OF PROXIMAL VEIN OF RIGHT LOWER EXTREMITY (HCC): ICD-10-CM

## 2025-03-04 DIAGNOSIS — I82.811 ACUTE SUPERFICIAL VENOUS THROMBOSIS OF RIGHT LOWER EXTREMITY: ICD-10-CM

## 2025-03-04 PROCEDURE — 93971 EXTREMITY STUDY: CPT | Performed by: SURGERY

## 2025-06-02 ENCOUNTER — TELEPHONE (OUTPATIENT)
Dept: FAMILY MEDICINE CLINIC | Age: 40
End: 2025-06-02

## 2025-06-02 NOTE — TELEPHONE ENCOUNTER
Patient calling in not sure if he needs a refill on the medication or if done taking the medication. Please call patient  and advise       Medication: apixaban (ELIQUIS) 5 MG TABS tablet     Pharmacy: [facility]

## 2025-06-05 DIAGNOSIS — I82.5Y1 CHRONIC DEEP VEIN THROMBOSIS (DVT) OF PROXIMAL VEIN OF RIGHT LOWER EXTREMITY (HCC): ICD-10-CM

## 2025-06-05 DIAGNOSIS — I82.811 ACUTE SUPERFICIAL VENOUS THROMBOSIS OF RIGHT LOWER EXTREMITY: ICD-10-CM

## 2025-07-08 ENCOUNTER — OFFICE VISIT (OUTPATIENT)
Dept: FAMILY MEDICINE CLINIC | Age: 40
End: 2025-07-08
Payer: COMMERCIAL

## 2025-07-08 VITALS
WEIGHT: 315 LBS | HEART RATE: 96 BPM | DIASTOLIC BLOOD PRESSURE: 87 MMHG | HEIGHT: 72 IN | RESPIRATION RATE: 16 BRPM | SYSTOLIC BLOOD PRESSURE: 127 MMHG | BODY MASS INDEX: 42.66 KG/M2 | OXYGEN SATURATION: 98 %

## 2025-07-08 DIAGNOSIS — I82.890 SUPERFICIAL VEIN THROMBOSIS: ICD-10-CM

## 2025-07-08 DIAGNOSIS — I82.5Y1 CHRONIC DEEP VEIN THROMBOSIS (DVT) OF PROXIMAL VEIN OF RIGHT LOWER EXTREMITY (HCC): ICD-10-CM

## 2025-07-08 PROCEDURE — G8417 CALC BMI ABV UP PARAM F/U: HCPCS

## 2025-07-08 PROCEDURE — 3079F DIAST BP 80-89 MM HG: CPT

## 2025-07-08 PROCEDURE — 3074F SYST BP LT 130 MM HG: CPT

## 2025-07-08 PROCEDURE — G8427 DOCREV CUR MEDS BY ELIG CLIN: HCPCS

## 2025-07-08 PROCEDURE — 99214 OFFICE O/P EST MOD 30 MIN: CPT

## 2025-07-08 PROCEDURE — 1036F TOBACCO NON-USER: CPT

## 2025-07-21 ENCOUNTER — HOSPITAL ENCOUNTER (OUTPATIENT)
Dept: VASCULAR LAB | Age: 40
Discharge: HOME | End: 2025-07-23
Payer: COMMERCIAL

## 2025-07-21 DIAGNOSIS — I82.890 SUPERFICIAL VEIN THROMBOSIS: ICD-10-CM

## 2025-07-21 DIAGNOSIS — I82.5Y1 CHRONIC DEEP VEIN THROMBOSIS (DVT) OF PROXIMAL VEIN OF RIGHT LOWER EXTREMITY (HCC): ICD-10-CM

## 2025-07-21 LAB
VAS RIGHT CFV RFX: 1 S
VAS RIGHT FV MID RFX: 1.1 S
VAS RIGHT GSV JUNC RFX: 0.7 S

## 2025-07-21 PROCEDURE — 93971 EXTREMITY STUDY: CPT

## 2025-07-21 PROCEDURE — 93971 EXTREMITY STUDY: CPT | Performed by: SURGERY

## 2025-07-25 ENCOUNTER — OFFICE VISIT (OUTPATIENT)
Dept: VASCULAR SURGERY | Age: 40
End: 2025-07-25

## 2025-07-25 VITALS
HEIGHT: 72 IN | WEIGHT: 315 LBS | DIASTOLIC BLOOD PRESSURE: 80 MMHG | SYSTOLIC BLOOD PRESSURE: 132 MMHG | BODY MASS INDEX: 42.66 KG/M2

## 2025-07-25 DIAGNOSIS — I82.461 ACUTE DEEP VEIN THROMBOSIS (DVT) OF CALF MUSCLE VEIN OF RIGHT LOWER EXTREMITY (HCC): ICD-10-CM

## 2025-07-25 DIAGNOSIS — I80.01 THROMBOPHLEBITIS OF SUPERFICIAL VEINS OF RIGHT LOWER EXTREMITY: Primary | ICD-10-CM

## 2025-07-25 NOTE — PROGRESS NOTES
tablets by mouth daily 12/3/24  Yes Joann Jalloh DO   cetirizine (ZYRTEC) 10 MG tablet Take 1 tablet by mouth daily   Yes Provider, MD Amina        Allergies:  Oxycodone      Social History:      Social History     Socioeconomic History    Marital status:      Spouse name: Asiya    Number of children: 2    Years of education: 15    Highest education level: Not on file   Occupational History    Not on file   Tobacco Use    Smoking status: Never    Smokeless tobacco: Former   Vaping Use    Vaping status: Never Used   Substance and Sexual Activity    Alcohol use: Yes     Alcohol/week: 1.0 standard drink of alcohol     Types: 1 Cans of beer per week     Comment: Not a frequent drinker, more at social gatherings 4-6 times    Drug use: No    Sexual activity: Yes     Partners: Female   Other Topics Concern    Not on file   Social History Narrative    Not on file     Social Drivers of Health     Financial Resource Strain: Low Risk  (12/3/2024)    Overall Financial Resource Strain (CARDIA)     Difficulty of Paying Living Expenses: Not hard at all   Food Insecurity: No Food Insecurity (2/24/2025)    Hunger Vital Sign     Worried About Running Out of Food in the Last Year: Never true     Ran Out of Food in the Last Year: Never true   Transportation Needs: No Transportation Needs (2/24/2025)    PRAPARE - Transportation     Lack of Transportation (Medical): No     Lack of Transportation (Non-Medical): No   Physical Activity: Not on file   Stress: Not on file   Social Connections: Not on file   Intimate Partner Violence: Not on file   Housing Stability: Low Risk  (2/24/2025)    Housing Stability Vital Sign     Unable to Pay for Housing in the Last Year: No     Number of Times Moved in the Last Year: 0     Homeless in the Last Year: No       Family History:        Problem Relation Age of Onset    COPD Mother     Arthritis Mother     Asthma Mother     Rheum Arthritis Mother     High Blood Pressure Father

## (undated) DEVICE — SUTURE PERMA-HAND SZ 2-0 L30IN NONABSORBABLE BLK L26MM SH K833H

## (undated) DEVICE — TISSUE RETRIEVAL SYSTEM: Brand: INZII RETRIEVAL SYSTEM

## (undated) DEVICE — SUTURE VCRL + SZ 0 L27IN ABSRB VLT L26MM UR-6 5/8 CIR VCP603H

## (undated) DEVICE — MARYLAND BIPOLAR FORCEPS: Brand: ENDOWRIST

## (undated) DEVICE — COLUMN DRAPE

## (undated) DEVICE — STOPCOCK 3-WAY INTRAVENOUS

## (undated) DEVICE — TIP COVER ACCESSORY

## (undated) DEVICE — Device

## (undated) DEVICE — SUTURE PERMAHAND SZ 3-0 L30IN NONABSORBABLE BLK SH L26MM K832H

## (undated) DEVICE — SUTURE VCRL + SZ 3-0 L18IN ABSRB UD SH 1/2 CIR TAPERCUT NDL VCP864D

## (undated) DEVICE — SEAL

## (undated) DEVICE — BLADELESS OBTURATOR: Brand: WECK VISTA

## (undated) DEVICE — GLOVE,SURG,SENSICARE SLT,LF,PF,7: Brand: MEDLINE

## (undated) DEVICE — MEDIUM-LARGE CLIP APPLIER: Brand: ENDOWRIST

## (undated) DEVICE — KIT DRNGE W/ 100MLBULB JP

## (undated) DEVICE — SOLUTION IV IRRIG WATER 1000ML POUR BRL 2F7114

## (undated) DEVICE — TROCAR: Brand: KII FIOS FIRST ENTRY

## (undated) DEVICE — FENESTRATED BIPOLAR FORCEPS: Brand: ENDOWRIST

## (undated) DEVICE — SYSTEM SMK EVAC LAP TBNG FILTER HSNG BENT STYL PNK SEE CLR

## (undated) DEVICE — REDUCER: Brand: ENDOWRIST

## (undated) DEVICE — CATHETER CHOLGM 4.5FR L18IN W/ MTL SUPP TB

## (undated) DEVICE — SOLUTION IV 1000ML 0.9% SOD CHL PH 5 INJ USP VIAFLX PLAS

## (undated) DEVICE — MONOPOLAR CURVED SCISSORS: Brand: ENDOWRIST

## (undated) DEVICE — C-ARM: Brand: UNBRANDED

## (undated) DEVICE — 3M™ TEGADERM™ TRANSPARENT FILM DRESSING FRAME STYLE WITH BORDER, 1616, 4 IN X 4-3/4 IN (10 CM X 12 CM), 50/CT 4CT/CASE: Brand: 3M™ TEGADERM™

## (undated) DEVICE — ARM DRAPE

## (undated) DEVICE — CANNULA SEAL

## (undated) DEVICE — GAUZE,SPONGE,2"X2",8PLY,STERILE,LF,2'S: Brand: MEDLINE

## (undated) DEVICE — PUMP SUC IRR TBNG L10FT W/ HNDPC ASSEMB STRYKEFLOW 2

## (undated) DEVICE — MEGA SUTURECUT ND: Brand: ENDOWRIST

## (undated) DEVICE — DRAIN SURG 15FR SIL RND CHN W/ TRCR FULL FLUT DBL WRP TRAD